# Patient Record
Sex: MALE | Race: WHITE | NOT HISPANIC OR LATINO | Employment: UNEMPLOYED | URBAN - NONMETROPOLITAN AREA
[De-identification: names, ages, dates, MRNs, and addresses within clinical notes are randomized per-mention and may not be internally consistent; named-entity substitution may affect disease eponyms.]

---

## 2017-05-14 ENCOUNTER — APPOINTMENT (OUTPATIENT)
Dept: CT IMAGING | Facility: HOSPITAL | Age: 57
End: 2017-05-14

## 2017-05-14 ENCOUNTER — APPOINTMENT (OUTPATIENT)
Dept: GENERAL RADIOLOGY | Facility: HOSPITAL | Age: 57
End: 2017-05-14

## 2017-05-14 ENCOUNTER — HOSPITAL ENCOUNTER (EMERGENCY)
Facility: HOSPITAL | Age: 57
Discharge: LEFT BEFORE DISCHARGE | End: 2017-05-15
Attending: EMERGENCY MEDICINE | Admitting: EMERGENCY MEDICINE

## 2017-05-14 DIAGNOSIS — G45.9 TRANSIENT CEREBRAL ISCHEMIA, UNSPECIFIED TYPE: ICD-10-CM

## 2017-05-14 DIAGNOSIS — I16.1 HYPERTENSIVE EMERGENCY WITHOUT CONGESTIVE HEART FAILURE: Primary | ICD-10-CM

## 2017-05-14 DIAGNOSIS — R07.9 CHEST PAIN, UNSPECIFIED TYPE: ICD-10-CM

## 2017-05-14 LAB
6-ACETYL MORPHINE: NEGATIVE
ALBUMIN SERPL-MCNC: 3.5 G/DL (ref 3.5–5)
ALBUMIN/GLOB SERPL: 1.2 G/DL (ref 1.5–2.5)
ALP SERPL-CCNC: 93 U/L (ref 40–129)
ALT SERPL W P-5'-P-CCNC: 14 U/L (ref 10–44)
AMPHET+METHAMPHET UR QL: NEGATIVE
AMYLASE SERPL-CCNC: 44 U/L (ref 28–100)
ANION GAP SERPL CALCULATED.3IONS-SCNC: ABNORMAL MMOL/L (ref 3.6–11.2)
APTT PPP: 27.9 SECONDS (ref 24.4–31)
AST SERPL-CCNC: 18 U/L (ref 10–34)
BACTERIA UR QL AUTO: ABNORMAL /HPF
BARBITURATES UR QL SCN: NEGATIVE
BASOPHILS # BLD AUTO: 0.07 10*3/MM3 (ref 0–0.3)
BASOPHILS NFR BLD AUTO: 0.9 % (ref 0–2)
BENZODIAZ UR QL SCN: NEGATIVE
BILIRUB SERPL-MCNC: 0.5 MG/DL (ref 0.2–1.8)
BILIRUB UR QL STRIP: NEGATIVE
BUN BLD-MCNC: 10 MG/DL (ref 7–21)
BUN/CREAT SERPL: 14.3 (ref 7–25)
BUPRENORPHINE SERPL-MCNC: NEGATIVE NG/ML
CALCIUM SPEC-SCNC: 9.6 MG/DL (ref 7.7–10)
CANNABINOIDS SERPL QL: NEGATIVE
CHLORIDE SERPL-SCNC: 106 MMOL/L (ref 99–112)
CK MB SERPL-CCNC: 1.76 NG/ML (ref 0–5)
CK MB SERPL-RTO: 2.5 % (ref 0–3)
CK SERPL-CCNC: 71 U/L (ref 24–204)
CLARITY UR: CLEAR
CO2 SERPL-SCNC: >40 MMOL/L (ref 24.3–31.9)
COCAINE UR QL: NEGATIVE
COLOR UR: YELLOW
CREAT BLD-MCNC: 0.7 MG/DL (ref 0.43–1.29)
DEPRECATED RDW RBC AUTO: 43 FL (ref 37–54)
EOSINOPHIL # BLD AUTO: 0.23 10*3/MM3 (ref 0–0.7)
EOSINOPHIL NFR BLD AUTO: 3 % (ref 0–5)
ERYTHROCYTE [DISTWIDTH] IN BLOOD BY AUTOMATED COUNT: 13.6 % (ref 11.5–14.5)
ETHANOL BLD-MCNC: 66 MG/DL
ETHANOL UR QL: 0.07 %
GFR SERPL CREATININE-BSD FRML MDRD: 117 ML/MIN/1.73
GLOBULIN UR ELPH-MCNC: 3 GM/DL
GLUCOSE BLD-MCNC: 93 MG/DL (ref 70–110)
GLUCOSE UR STRIP-MCNC: NEGATIVE MG/DL
HCT VFR BLD AUTO: 45 % (ref 42–52)
HGB BLD-MCNC: 15.7 G/DL (ref 14–18)
HGB UR QL STRIP.AUTO: ABNORMAL
HYALINE CASTS UR QL AUTO: ABNORMAL /LPF
IMM GRANULOCYTES # BLD: 0.01 10*3/MM3 (ref 0–0.03)
IMM GRANULOCYTES NFR BLD: 0.1 % (ref 0–0.5)
INR PPP: 0.9 (ref 0.8–1.1)
KETONES UR QL STRIP: NEGATIVE
LEUKOCYTE ESTERASE UR QL STRIP.AUTO: NEGATIVE
LIPASE SERPL-CCNC: 32 U/L (ref 13–60)
LYMPHOCYTES # BLD AUTO: 2.69 10*3/MM3 (ref 1–3)
LYMPHOCYTES NFR BLD AUTO: 35.6 % (ref 21–51)
MCH RBC QN AUTO: 30.7 PG (ref 27–33)
MCHC RBC AUTO-ENTMCNC: 34.9 G/DL (ref 33–37)
MCV RBC AUTO: 87.9 FL (ref 80–94)
MDMA UR QL SCN: NEGATIVE
METHADONE UR QL SCN: NEGATIVE
MONOCYTES # BLD AUTO: 0.63 10*3/MM3 (ref 0.1–0.9)
MONOCYTES NFR BLD AUTO: 8.3 % (ref 0–10)
MYOGLOBIN SERPL-MCNC: 41 NG/ML (ref 0–109)
NEUTROPHILS # BLD AUTO: 3.93 10*3/MM3 (ref 1.4–6.5)
NEUTROPHILS NFR BLD AUTO: 52.1 % (ref 30–70)
NITRITE UR QL STRIP: NEGATIVE
OPIATES UR QL: NEGATIVE
OSMOLALITY SERPL CALC.SUM OF ELEC: 280 MOSM/KG (ref 273–305)
OXYCODONE UR QL SCN: NEGATIVE
PCP UR QL SCN: NEGATIVE
PH UR STRIP.AUTO: 6.5 [PH] (ref 5–8)
PLATELET # BLD AUTO: 300 10*3/MM3 (ref 130–400)
PMV BLD AUTO: 7.9 FL (ref 6–10)
POTASSIUM BLD-SCNC: 3.4 MMOL/L (ref 3.5–5.3)
PROT SERPL-MCNC: 6.5 G/DL (ref 6–8)
PROT UR QL STRIP: ABNORMAL
PROTHROMBIN TIME: 9.9 SECONDS (ref 9.8–11.9)
RBC # BLD AUTO: 5.12 10*6/MM3 (ref 4.7–6.1)
RBC # UR: ABNORMAL /HPF
REF LAB TEST METHOD: ABNORMAL
SODIUM BLD-SCNC: 141 MMOL/L (ref 135–153)
SP GR UR STRIP: 1.01 (ref 1–1.03)
SQUAMOUS #/AREA URNS HPF: ABNORMAL /HPF
TROPONIN I SERPL-MCNC: 0.18 NG/ML
UROBILINOGEN UR QL STRIP: ABNORMAL
WBC NRBC COR # BLD: 7.56 10*3/MM3 (ref 4.5–12.5)
WBC UR QL AUTO: ABNORMAL /HPF

## 2017-05-14 PROCEDURE — 70450 CT HEAD/BRAIN W/O DYE: CPT | Performed by: RADIOLOGY

## 2017-05-14 PROCEDURE — 85025 COMPLETE CBC W/AUTO DIFF WBC: CPT | Performed by: EMERGENCY MEDICINE

## 2017-05-14 PROCEDURE — 93010 ELECTROCARDIOGRAM REPORT: CPT | Performed by: INTERNAL MEDICINE

## 2017-05-14 PROCEDURE — 99284 EMERGENCY DEPT VISIT MOD MDM: CPT

## 2017-05-14 PROCEDURE — 85610 PROTHROMBIN TIME: CPT | Performed by: EMERGENCY MEDICINE

## 2017-05-14 PROCEDURE — 80307 DRUG TEST PRSMV CHEM ANLYZR: CPT | Performed by: EMERGENCY MEDICINE

## 2017-05-14 PROCEDURE — 80053 COMPREHEN METABOLIC PANEL: CPT | Performed by: EMERGENCY MEDICINE

## 2017-05-14 PROCEDURE — 71020 HC CHEST PA AND LATERAL: CPT

## 2017-05-14 PROCEDURE — 82150 ASSAY OF AMYLASE: CPT | Performed by: EMERGENCY MEDICINE

## 2017-05-14 PROCEDURE — 93005 ELECTROCARDIOGRAM TRACING: CPT | Performed by: EMERGENCY MEDICINE

## 2017-05-14 PROCEDURE — 71020 XR CHEST 2 VW: CPT | Performed by: RADIOLOGY

## 2017-05-14 PROCEDURE — 83690 ASSAY OF LIPASE: CPT | Performed by: EMERGENCY MEDICINE

## 2017-05-14 PROCEDURE — 82553 CREATINE MB FRACTION: CPT | Performed by: EMERGENCY MEDICINE

## 2017-05-14 PROCEDURE — 82550 ASSAY OF CK (CPK): CPT | Performed by: EMERGENCY MEDICINE

## 2017-05-14 PROCEDURE — 70450 CT HEAD/BRAIN W/O DYE: CPT

## 2017-05-14 PROCEDURE — 84484 ASSAY OF TROPONIN QUANT: CPT | Performed by: EMERGENCY MEDICINE

## 2017-05-14 PROCEDURE — 85730 THROMBOPLASTIN TIME PARTIAL: CPT | Performed by: EMERGENCY MEDICINE

## 2017-05-14 PROCEDURE — 81001 URINALYSIS AUTO W/SCOPE: CPT | Performed by: EMERGENCY MEDICINE

## 2017-05-14 PROCEDURE — 83874 ASSAY OF MYOGLOBIN: CPT | Performed by: EMERGENCY MEDICINE

## 2017-05-14 RX ORDER — SODIUM CHLORIDE 0.9 % (FLUSH) 0.9 %
10 SYRINGE (ML) INJECTION AS NEEDED
Status: DISCONTINUED | OUTPATIENT
Start: 2017-05-14 | End: 2017-05-15 | Stop reason: HOSPADM

## 2017-05-14 RX ORDER — HYDRALAZINE HYDROCHLORIDE 20 MG/ML
10 INJECTION INTRAMUSCULAR; INTRAVENOUS ONCE
Status: COMPLETED | OUTPATIENT
Start: 2017-05-14 | End: 2017-05-15

## 2017-05-14 RX ORDER — CLONIDINE HYDROCHLORIDE 0.1 MG/1
0.2 TABLET ORAL ONCE
Status: COMPLETED | OUTPATIENT
Start: 2017-05-14 | End: 2017-05-14

## 2017-05-14 RX ADMIN — CLONIDINE HYDROCHLORIDE 0.2 MG: 0.1 TABLET ORAL at 22:40

## 2017-05-15 VITALS
WEIGHT: 150 LBS | HEART RATE: 77 BPM | HEIGHT: 66 IN | DIASTOLIC BLOOD PRESSURE: 88 MMHG | RESPIRATION RATE: 20 BRPM | BODY MASS INDEX: 24.11 KG/M2 | SYSTOLIC BLOOD PRESSURE: 159 MMHG | TEMPERATURE: 97.9 F | OXYGEN SATURATION: 94 %

## 2017-05-15 LAB
CK MB SERPL-CCNC: 1.56 NG/ML (ref 0–5)
CK MB SERPL-RTO: 2.6 % (ref 0–3)
CK SERPL-CCNC: 61 U/L (ref 24–204)
MYOGLOBIN SERPL-MCNC: 41 NG/ML (ref 0–109)
TROPONIN I SERPL-MCNC: 0.17 NG/ML

## 2017-05-15 PROCEDURE — 83874 ASSAY OF MYOGLOBIN: CPT | Performed by: EMERGENCY MEDICINE

## 2017-05-15 PROCEDURE — 82553 CREATINE MB FRACTION: CPT | Performed by: EMERGENCY MEDICINE

## 2017-05-15 PROCEDURE — 96374 THER/PROPH/DIAG INJ IV PUSH: CPT

## 2017-05-15 PROCEDURE — 82550 ASSAY OF CK (CPK): CPT | Performed by: EMERGENCY MEDICINE

## 2017-05-15 PROCEDURE — 84484 ASSAY OF TROPONIN QUANT: CPT | Performed by: EMERGENCY MEDICINE

## 2017-05-15 PROCEDURE — 25010000002 HYDRALAZINE PER 20 MG: Performed by: EMERGENCY MEDICINE

## 2017-05-15 RX ADMIN — HYDRALAZINE HYDROCHLORIDE 10 MG: 20 INJECTION INTRAMUSCULAR; INTRAVENOUS at 01:05

## 2020-06-25 ENCOUNTER — TRANSCRIBE ORDERS (OUTPATIENT)
Dept: ADMINISTRATIVE | Facility: HOSPITAL | Age: 60
End: 2020-06-25

## 2020-06-25 ENCOUNTER — LAB (OUTPATIENT)
Dept: LAB | Facility: HOSPITAL | Age: 60
End: 2020-06-25

## 2020-06-25 DIAGNOSIS — N18.9 CHRONIC KIDNEY DISEASE, UNSPECIFIED CKD STAGE: Primary | ICD-10-CM

## 2020-06-25 DIAGNOSIS — N18.9 CHRONIC KIDNEY DISEASE, UNSPECIFIED CKD STAGE: ICD-10-CM

## 2020-06-25 PROCEDURE — 36415 COLL VENOUS BLD VENIPUNCTURE: CPT

## 2020-06-25 PROCEDURE — 81001 URINALYSIS AUTO W/SCOPE: CPT

## 2020-06-25 PROCEDURE — 80053 COMPREHEN METABOLIC PANEL: CPT

## 2020-06-26 ENCOUNTER — LAB (OUTPATIENT)
Dept: LAB | Facility: HOSPITAL | Age: 60
End: 2020-06-26

## 2020-06-26 DIAGNOSIS — N18.9 CHRONIC KIDNEY DISEASE, UNSPECIFIED CKD STAGE: ICD-10-CM

## 2020-06-26 LAB
ALBUMIN SERPL-MCNC: 4 G/DL (ref 3.5–5.2)
ALBUMIN/GLOB SERPL: 1.7 G/DL
ALP SERPL-CCNC: 79 U/L (ref 39–117)
ALT SERPL W P-5'-P-CCNC: 16 U/L (ref 1–41)
ANION GAP SERPL CALCULATED.3IONS-SCNC: 11.7 MMOL/L (ref 5–15)
AST SERPL-CCNC: 16 U/L (ref 1–40)
BILIRUB SERPL-MCNC: 0.4 MG/DL (ref 0.2–1.2)
BUN BLD-MCNC: 30 MG/DL (ref 6–20)
BUN/CREAT SERPL: 13.7 (ref 7–25)
CALCIUM SPEC-SCNC: 9.5 MG/DL (ref 8.6–10.5)
CHLORIDE SERPL-SCNC: 100 MMOL/L (ref 98–107)
CO2 SERPL-SCNC: 22.3 MMOL/L (ref 22–29)
CREAT BLD-MCNC: 2.19 MG/DL (ref 0.76–1.27)
GFR SERPL CREATININE-BSD FRML MDRD: 31 ML/MIN/1.73
GLOBULIN UR ELPH-MCNC: 2.4 GM/DL
GLUCOSE BLD-MCNC: 139 MG/DL (ref 65–99)
POTASSIUM BLD-SCNC: 5.5 MMOL/L (ref 3.5–5.2)
PROT SERPL-MCNC: 6.4 G/DL (ref 6–8.5)
SODIUM BLD-SCNC: 134 MMOL/L (ref 136–145)

## 2020-06-26 PROCEDURE — 84156 ASSAY OF PROTEIN URINE: CPT

## 2020-06-26 PROCEDURE — 82570 ASSAY OF URINE CREATININE: CPT

## 2020-06-26 PROCEDURE — 82043 UR ALBUMIN QUANTITATIVE: CPT

## 2020-06-27 LAB
ALBUMIN UR-MCNC: 45.8 MG/DL
BACTERIA UR QL AUTO: ABNORMAL /HPF
BILIRUB UR QL STRIP: NEGATIVE
CLARITY UR: CLEAR
COLOR UR: YELLOW
CREAT UR-MCNC: 246.1 MG/DL
CREAT UR-MCNC: 246.1 MG/DL
GLUCOSE UR STRIP-MCNC: NEGATIVE MG/DL
HGB UR QL STRIP.AUTO: NEGATIVE
HYALINE CASTS UR QL AUTO: ABNORMAL /LPF
KETONES UR QL STRIP: ABNORMAL
LEUKOCYTE ESTERASE UR QL STRIP.AUTO: NEGATIVE
MICROALBUMIN/CREAT UR: 186.1 MG/G
NITRITE UR QL STRIP: NEGATIVE
PH UR STRIP.AUTO: <=5 [PH] (ref 5–8)
PROT UR QL STRIP: ABNORMAL
PROT UR-MCNC: 66 MG/DL
PROT/CREAT UR: 268.2 MG/G CREA (ref 0–200)
RBC # UR: ABNORMAL /HPF
REF LAB TEST METHOD: ABNORMAL
SP GR UR STRIP: 1.02 (ref 1–1.03)
SQUAMOUS #/AREA URNS HPF: ABNORMAL /HPF
UROBILINOGEN UR QL STRIP: ABNORMAL
WBC UR QL AUTO: ABNORMAL /HPF

## 2023-04-25 ENCOUNTER — OFFICE VISIT (OUTPATIENT)
Dept: PULMONOLOGY | Facility: CLINIC | Age: 63
End: 2023-04-25
Payer: MEDICAID

## 2023-04-25 VITALS
SYSTOLIC BLOOD PRESSURE: 102 MMHG | WEIGHT: 166 LBS | TEMPERATURE: 98 F | DIASTOLIC BLOOD PRESSURE: 60 MMHG | RESPIRATION RATE: 18 BRPM | HEART RATE: 77 BPM | BODY MASS INDEX: 26.68 KG/M2 | OXYGEN SATURATION: 92 % | HEIGHT: 66 IN

## 2023-04-25 DIAGNOSIS — J44.9 CHRONIC OBSTRUCTIVE PULMONARY DISEASE, UNSPECIFIED COPD TYPE: Primary | ICD-10-CM

## 2023-04-25 DIAGNOSIS — E66.3 OVERWEIGHT: ICD-10-CM

## 2023-04-25 DIAGNOSIS — F17.211 CIGARETTE NICOTINE DEPENDENCE IN REMISSION: ICD-10-CM

## 2023-04-25 RX ORDER — ATORVASTATIN CALCIUM 40 MG/1
40 TABLET, FILM COATED ORAL DAILY
COMMUNITY
Start: 2023-03-28

## 2023-04-25 RX ORDER — BENZOCAINE 7.5; 5 MG/1; MG/1
LOZENGE ORAL
COMMUNITY
Start: 2023-04-13

## 2023-04-25 RX ORDER — ASPIRIN 81 MG/1
1 TABLET, COATED ORAL DAILY
COMMUNITY
Start: 2023-03-28

## 2023-04-25 RX ORDER — TAMSULOSIN HYDROCHLORIDE 0.4 MG/1
CAPSULE ORAL
COMMUNITY
Start: 2023-04-13

## 2023-04-25 RX ORDER — ALBUTEROL SULFATE 90 UG/1
AEROSOL, METERED RESPIRATORY (INHALATION)
COMMUNITY
Start: 2023-03-28

## 2023-04-25 RX ORDER — LABETALOL 200 MG/1
TABLET, FILM COATED ORAL
COMMUNITY
Start: 2023-03-28

## 2023-04-25 RX ORDER — BUDESONIDE, GLYCOPYRROLATE, AND FORMOTEROL FUMARATE 160; 9; 4.8 UG/1; UG/1; UG/1
2 AEROSOL, METERED RESPIRATORY (INHALATION) 2 TIMES DAILY
Qty: 10.7 G | Refills: 5 | Status: SHIPPED | OUTPATIENT
Start: 2023-04-25

## 2023-04-25 RX ORDER — DOXYCYCLINE 100 MG/1
CAPSULE ORAL
COMMUNITY
Start: 2023-04-13

## 2023-04-25 RX ORDER — FLUTICASONE PROPIONATE AND SALMETEROL 50; 250 UG/1; UG/1
POWDER RESPIRATORY (INHALATION)
COMMUNITY
Start: 2023-03-28 | End: 2023-04-25

## 2023-04-25 RX ORDER — LISINOPRIL 20 MG/1
TABLET ORAL
COMMUNITY
Start: 2023-03-28 | End: 2023-04-27

## 2023-04-25 NOTE — PROGRESS NOTES
"Chief Complaint  Shortness of Breath    Subjective        Florencio Cintron presents to [facility] PULMONARY & CRITICAL CARE MEDICINE  History of Present Illness     Mr. Cintron is a 62 year old male with a medical history significant for hypertension, and COPD.    He presents today for evaluation of COPD.  He states that he has had shortness of breath for several years and that it is worse with exertion and at night.  He also reports an occasional cough.  He is currently taking Adviar BID and albuterol as needed.  He reports that these inhalers do help some.  He is not currently using any oxygen.  He is a former smoker, quitting 3 years ago.  He states that he smoked 2 ppd for 40 years.        Objective   Vital Signs:  /60   Pulse 77   Temp 98 °F (36.7 °C) (Temporal)   Resp 18   Ht 167.6 cm (66\")   Wt 75.3 kg (166 lb)   SpO2 92%   BMI 26.79 kg/m²   Estimated body mass index is 26.79 kg/m² as calculated from the following:    Height as of this encounter: 167.6 cm (66\").    Weight as of this encounter: 75.3 kg (166 lb).       BMI is >= 25 and <30. (Overweight) The following options were offered after discussion;: exercise counseling/recommendations and nutrition counseling/recommendations      Physical Exam     GENERAL APPEARANCE: Well developed, well nourished, alert and cooperative, and appears to be in no acute distress.    HEAD: normocephalic. Atraumatic.    EYES: PERRL, EOMI. Vision is grossly intact.    THROAT: Oral cavity and pharynx normal. No inflammation, swelling, exudate, or lesions.     NECK: Neck supple.  No thyromegaly.    CARDIAC: Normal S1 and S2. No S3, S4 or murmurs. Rhythm is regular.     RESPIRATORY:Bilateral air entry positive. Bilateral diminished breath sounds. No wheezing, crackles or rhonchi noted.    GI: Positive bowel sounds. Soft, nondistended, nontender.     MUSCULOSKELETAL: No significant deformity or joint abnormality. No edema. Peripheral pulses intact. No " varicosities.    NEUROLOGICAL: Strength and sensation symmetric and intact throughout.     PSYCHIATRIC: The mental examination revealed the patient was oriented to person, place, and time.     Result Review :  The following data was reviewed by: OLIVIA Hunter on 04/25/2023:                   Assessment and Plan   Diagnoses and all orders for this visit:    1. Chronic obstructive pulmonary disease, unspecified COPD type (Primary)  -     Overnight Sleep Oximetry Study; Future    2. Overweight    3. Cigarette nicotine dependence in remission    Other orders  -     Budeson-Glycopyrrol-Formoterol (Breztri Aerosphere) 160-9-4.8 MCG/ACT aerosol inhaler; Inhale 2 puffs 2 (Two) Times a Day.  Dispense: 10.7 g; Refill: 5         PFT was reviewed.  Severe obstruction was noted.  CT Chest was also reviewed.    Will discontinue Advair and start him on Breztri BID.  Inhaler education was provided.       - Inhaler technique demonstration/discussion:  I have extensively discussed the steps.  In summary, the steps were discussed in the following order.Patient was advised to rinse the mouth after steroid inhalation to prevent fungal mucositis.  · Remove the cap from the inhaler and shake well.    · Breathe out all the way.    · Place the mouthpiece of the inhaler between your teeth and seal your lips tightly around it.    · As you start to breathe in slowly, press down on the canister one time.   · Keep breathing in as slowly and deeply as you can.    · It should take about 5 seconds for you to completely breathe in.    · Hold your breath for 10 seconds(count to 10 slowly) to allow the medication to reach the airways of the lung.    · Repeat the above steps for each puff.    · Wait about 1 minute between the puffs.    · Replaced the cap on the inhaler when finished.    Continue albuterol as needed.      6 MWT was completed in office.  No oxygen desaturations were noted with ambulation.    Ordered an overnight pulse  oximetry study to assess oxygen while asleep.        Will resume annual lung cancer screening in April 2024.      Follow Up   Return in about 2 months (around 6/25/2023).  Patient was given instructions and counseling regarding his condition or for health maintenance advice. Please see specific information pulled into the AVS if appropriate.

## 2023-04-27 ENCOUNTER — OFFICE VISIT (OUTPATIENT)
Dept: CARDIOLOGY | Facility: CLINIC | Age: 63
End: 2023-04-27
Payer: MEDICAID

## 2023-04-27 VITALS
BODY MASS INDEX: 26.33 KG/M2 | HEIGHT: 66 IN | DIASTOLIC BLOOD PRESSURE: 77 MMHG | OXYGEN SATURATION: 98 % | HEART RATE: 61 BPM | SYSTOLIC BLOOD PRESSURE: 156 MMHG | WEIGHT: 163.8 LBS

## 2023-04-27 DIAGNOSIS — Z78.9 ALCOHOL USE: ICD-10-CM

## 2023-04-27 DIAGNOSIS — Z95.810 PRESENCE OF EXTERNAL CARDIAC DEFIBRILLATOR: ICD-10-CM

## 2023-04-27 DIAGNOSIS — I10 PRIMARY HYPERTENSION: ICD-10-CM

## 2023-04-27 DIAGNOSIS — I25.10 CAD, MULTIPLE VESSEL: Primary | ICD-10-CM

## 2023-04-27 DIAGNOSIS — I42.9 CARDIOMYOPATHY, UNSPECIFIED TYPE: ICD-10-CM

## 2023-04-27 DIAGNOSIS — N18.30 STAGE 3 CHRONIC KIDNEY DISEASE, UNSPECIFIED WHETHER STAGE 3A OR 3B CKD: ICD-10-CM

## 2023-04-27 DIAGNOSIS — I50.20 HEART FAILURE WITH REDUCED EJECTION FRACTION: ICD-10-CM

## 2023-04-27 RX ORDER — ISOSORBIDE MONONITRATE 30 MG/1
30 TABLET, EXTENDED RELEASE ORAL DAILY
Qty: 30 TABLET | Refills: 1 | Status: SHIPPED | OUTPATIENT
Start: 2023-04-27

## 2023-04-27 RX ORDER — BUMETANIDE 0.5 MG/1
0.5 TABLET ORAL EVERY OTHER DAY
Qty: 30 TABLET | Refills: 0 | Status: SHIPPED | OUTPATIENT
Start: 2023-04-27

## 2023-04-27 RX ORDER — CLOPIDOGREL BISULFATE 75 MG/1
75 TABLET ORAL DAILY
Qty: 30 TABLET | Refills: 1 | Status: SHIPPED | OUTPATIENT
Start: 2023-04-27

## 2023-04-27 RX ORDER — SACUBITRIL AND VALSARTAN 24; 26 MG/1; MG/1
1 TABLET, FILM COATED ORAL 2 TIMES DAILY
Qty: 28 TABLET | Refills: 0 | COMMUNITY
Start: 2023-04-29

## 2023-04-27 NOTE — LETTER
May 3, 2023     OLIVIA Christopher  [address]  [address]  [facility] [address]    Patient: Florencio Cintron   YOB: 1960   Date of Visit: 4/27/2023       Dear OLIVIA Christopher,    Florencio Cintron was in my office today. Below are the relevant portions of my assessment and plan of care.           If you have questions, please do not hesitate to call me. I look forward to following Florencio along with you.         Sincerely,        OLIVIA Barone        CC: No Recipients

## 2023-04-27 NOTE — PROGRESS NOTES
Subjective     Florencio Cintron is a 62 y.o. male.   Chief Complaint   Patient presents with   • Establish Care     Cardiac Care    History of Present Illness   Florencio Cintron is a 62-year-old male who presents to clinic today as a new patient for cardiology evaluation.     He was referred by his PCP for further cardiac evaluation.  He states symptoms began when he presented to his PCP for evaluation of shortness of air and an EKG showed abnormal.  He was subsequently sent to [facility] where he was hospitalized and transferred to [facility] due to cardiomyopathy and multivessel CAD.  Bypass surgery was planned however patient left the hospital.  He does have a LifeVest on.  He has continued to take his medications as prescribed at discharge he reports overall feeling okay.  He states breathing is stable, denies swelling however does complain of orthopnea.  He denies chest discomfort or palpitations.  He has an appointment to see nephrology due to CKD.  He denies any bleeding issues on current medications.  He reports former smoker quit about 3 years ago however does report alcohol use.    Multivessel CAD currently on aspirin.  Hyperlipidemia currently on Lipitor 40 mg nightly.  Hypertension currently on labetalol twice a day, lisinopril 20 mg twice daily.    There is no problem list on file for this patient.    Past Medical History:   Diagnosis Date   • Chronic kidney disease    • COPD (chronic obstructive pulmonary disease)    • Hypertension    • Myocardial infarction    • Stroke      Past Surgical History:   Procedure Laterality Date   • APPENDECTOMY     • TONSILLECTOMY         Family History   Problem Relation Age of Onset   • Heart disease Mother      Social History     Tobacco Use   • Smoking status: Former     Packs/day: 2.00     Years: 40.00     Pack years: 80.00     Types: Cigarettes     Quit date: 4/1/2020     Years since quitting: 3.0   • Smokeless tobacco: Never   Substance Use Topics   •  Alcohol use: Yes     Comment: qnce a week   • Drug use: Not Currently         The following portions of the patient's history were reviewed and updated as appropriate: allergies, current medications, past family history, past medical history, past social history, past surgical history and problem list.    No Known Allergies      Current Outpatient Medications:   •  Aspirin Low Dose 81 MG EC tablet, Take 1 tablet by mouth Daily., Disp: , Rfl:   •  atorvastatin (LIPITOR) 40 MG tablet, Take 1 tablet by mouth Daily. for cholesterol, Disp: , Rfl:   •  Budeson-Glycopyrrol-Formoterol (Breztri Aerosphere) 160-9-4.8 MCG/ACT aerosol inhaler, Inhale 2 puffs 2 (Two) Times a Day., Disp: 10.7 g, Rfl: 5  •  doxycycline (MONODOX) 100 MG capsule, TAKE ONE CAPSULE BY MOUTH TWO TIMES A DAY FOR 10 DAYS FOR INFECTION **FINISH ALL OF THIS MEDICATION**, Disp: , Rfl:   •  Earwax Removal 6.5 % otic solution, INSTILL 4 DROPS IN AFFECTED EAR(S) TWO TIMES A DAY FOR 5 DAYS, Disp: , Rfl:   •  labetalol (NORMODYNE) 200 MG tablet, TAKE ONE TABLET BY MOUTH TWO TIMES A DAY FOR BLOOD PRESSURE, Disp: , Rfl:   •  tamsulosin (FLOMAX) 0.4 MG capsule 24 hr capsule, TAKE ONE CAPSULE BY MOUTH DAILY FOR PROSTATE, Disp: , Rfl:   •  Ventolin  (90 Base) MCG/ACT inhaler, INHALE TWO PUFFS BY MOUTH EVERY 4 TO 6 HOURS AS NEEDED FOR SHORTNESS OF BREATH AND/OR WHEEZING, Disp: , Rfl:   •  bumetanide (BUMEX) 0.5 MG tablet, Take 1 tablet by mouth Every Other Day., Disp: 30 tablet, Rfl: 0  •  clopidogrel (PLAVIX) 75 MG tablet, Take 1 tablet by mouth Daily., Disp: 30 tablet, Rfl: 1  •  isosorbide mononitrate (IMDUR) 30 MG 24 hr tablet, Take 1 tablet by mouth Daily., Disp: 30 tablet, Rfl: 1  •  sacubitril-valsartan (Entresto) 24-26 MG tablet, Take 1 tablet by mouth 2 (Two) Times a Day., Disp: 28 tablet, Rfl: 0    Review of Systems   Constitutional: Negative for activity change, appetite change, chills, diaphoresis, fatigue and fever.   HENT: Negative for congestion,  "drooling, ear discharge, ear pain, mouth sores, nosebleeds, postnasal drip, rhinorrhea, sinus pressure, sneezing and sore throat.    Eyes: Negative for pain, discharge and visual disturbance.   Respiratory: Positive for shortness of breath. Negative for cough, chest tightness and wheezing.    Cardiovascular: Negative for chest pain, palpitations and leg swelling.   Gastrointestinal: Negative for abdominal pain, constipation, diarrhea, nausea and vomiting.   Endocrine: Negative for cold intolerance, heat intolerance, polydipsia, polyphagia and polyuria.   Musculoskeletal: Negative for arthralgias, myalgias and neck pain.   Skin: Negative for rash and wound.   Neurological: Negative for dizziness, syncope, speech difficulty, weakness, light-headedness and headaches.   Hematological: Negative for adenopathy. Does not bruise/bleed easily.   Psychiatric/Behavioral: Negative for confusion, dysphoric mood and sleep disturbance. The patient is not nervous/anxious.    All other systems reviewed and are negative.    /77 (BP Location: Left arm, Patient Position: Sitting, Cuff Size: Adult)   Pulse 61   Ht 167.6 cm (66\")   Wt 74.3 kg (163 lb 12.8 oz)   SpO2 98%   BMI 26.44 kg/m²     Objective   No Known Allergies    Physical Exam  Vitals reviewed.   Constitutional:       Appearance: Normal appearance. He is well-developed.   HENT:      Head: Normocephalic.   Eyes:      Conjunctiva/sclera: Conjunctivae normal.   Neck:      Thyroid: No thyromegaly.      Vascular: No carotid bruit or JVD.   Cardiovascular:      Rate and Rhythm: Normal rate and regular rhythm.      Comments: LifeVest on   Pulmonary:      Effort: Pulmonary effort is normal.      Breath sounds: Normal breath sounds.   Abdominal:      General: Bowel sounds are normal.      Palpations: Abdomen is soft. There is no hepatomegaly or splenomegaly.      Tenderness: There is no abdominal tenderness.   Musculoskeletal:      Cervical back: Neck supple.      Right " lower leg: No edema.      Left lower leg: No edema.   Skin:     General: Skin is warm and dry.   Neurological:      Mental Status: He is alert and oriented to person, place, and time.   Psychiatric:         Attention and Perception: Attention normal.         Mood and Affect: Mood normal.         Speech: Speech normal.         Behavior: Behavior normal. Behavior is cooperative.         Cognition and Memory: Cognition normal.           ECG 12 Lead    Date/Time: 5/3/2023 11:25 AM  Performed by: Elinor Walls APRN  Authorized by: Elinor Walls APRN   Comparison: compared with previous ECG   Similar to previous ECG  Rhythm: sinus rhythm  Rate: normal  BPM: 61  Other findings: T wave abnormality and left ventricular hypertrophy    Clinical impression: abnormal EKG  Comments: QT/QTc - 419/421            LABS  WBC   Date Value Ref Range Status   05/14/2017 7.56 4.50 - 12.50 10*3/mm3 Final     RBC   Date Value Ref Range Status   05/14/2017 5.12 4.70 - 6.10 10*6/mm3 Final     Hemoglobin   Date Value Ref Range Status   05/14/2017 15.7 14.0 - 18.0 g/dL Final     Hematocrit   Date Value Ref Range Status   05/14/2017 45.0 42.0 - 52.0 % Final     MCV   Date Value Ref Range Status   05/14/2017 87.9 80.0 - 94.0 fL Final     MCH   Date Value Ref Range Status   05/14/2017 30.7 27.0 - 33.0 pg Final     MCHC   Date Value Ref Range Status   05/14/2017 34.9 33.0 - 37.0 g/dL Final     RDW   Date Value Ref Range Status   05/14/2017 13.6 11.5 - 14.5 % Final     RDW-SD   Date Value Ref Range Status   05/14/2017 43.0 37.0 - 54.0 fl Final     MPV   Date Value Ref Range Status   05/14/2017 7.9 6.0 - 10.0 fL Final     Platelets   Date Value Ref Range Status   05/14/2017 300 130 - 400 10*3/mm3 Final     Neutrophil %   Date Value Ref Range Status   05/14/2017 52.1 30.0 - 70.0 % Final     Lymphocyte %   Date Value Ref Range Status   05/14/2017 35.6 21.0 - 51.0 % Final     Monocyte %   Date Value Ref Range Status   05/14/2017 8.3 0.0 - 10.0  % Final     Eosinophil %   Date Value Ref Range Status   05/14/2017 3.0 0.0 - 5.0 % Final     Basophil %   Date Value Ref Range Status   05/14/2017 0.9 0.0 - 2.0 % Final     Immature Grans %   Date Value Ref Range Status   05/14/2017 0.1 0.0 - 0.5 % Final     Neutrophils, Absolute   Date Value Ref Range Status   05/14/2017 3.93 1.40 - 6.50 10*3/mm3 Final     Lymphocytes, Absolute   Date Value Ref Range Status   05/14/2017 2.69 1.00 - 3.00 10*3/mm3 Final     Monocytes, Absolute   Date Value Ref Range Status   05/14/2017 0.63 0.10 - 0.90 10*3/mm3 Final     Eosinophils, Absolute   Date Value Ref Range Status   05/14/2017 0.23 0.00 - 0.70 10*3/mm3 Final     Basophils, Absolute   Date Value Ref Range Status   05/14/2017 0.07 0.00 - 0.30 10*3/mm3 Final     Immature Grans, Absolute   Date Value Ref Range Status   05/14/2017 0.01 0.00 - 0.03 10*3/mm3 Final   No results found for: CHOL, TRIG, HDL, LDL    IMAGING  XR Chest 1 View    Result Date: 3/29/2023  No acute cardiopulmonary process. Continued follow-up recommended. Images reviewed, interpreted, and dictated by Dr. SANKET Car. Transcribed by IVONE Hess (R).    CT chest without IV contrast    Result Date: 4/1/2023  Small bilateral pleural effusions with lung base atelectasis. Coronary artery disease and cardiomegaly. Images personally reviewed, interpreted and dictated by BIBIANA Cavanaugh M.D.    Ultrasound Kidneys Bilateral    Result Date: 3/30/2023  No evidence of obstructive uropathy. Small amount of perihepatic and perinephric free fluid/small volume ascites. Questionable hepatic parenchymal disease/cirrhosis. Correlate clinically. Images personally reviewed, interpreted and dictated by THIAGO Ashraf.            Assessment & Plan   Diagnoses and all orders for this visit:    1. CAD, multiple vessel (Primary)  -     Ambulatory Referral to Cardiothoracic Surgery  -     ECG 12 Lead  Continue aspirin  Start Plavix 75 mg daily  Start Imdur 30 mg  daily  Continue statin  Referral to cardiothoracic surgeon     2. Cardiomyopathy, unspecified type  Kidney function has been stable on lisinopril.  Stop lisinopril for 36 hours then start Entresto 24-26 twice daily with close monitoring of kidney function and BP.   Discussed risk and benefits of LifeVest  Continue on labetalol, will plan to transition to Coreg in the future if vitals remain stable and patient can tolerate  Unable to add MRA secondary to kidney function currently we will continue to monitor  Consider addition of SGLT2 and Verquvo if VS and labs allow     3. Heart failure with reduced ejection fraction  -     BNP; Future  Bumex 0.5 mg every other day, continue to closely monitor kidney function, labs prior to follow-up visit, sodium restrictions and daily weight management recommended.    4. Primary hypertension  -     Basic Metabolic Panel; Future  Elevated in clinic, will continue to monitor with medication changes, sodium restrictions    5. Stage 3 chronic kidney disease, unspecified whether stage 3a or 3b CKD  Continue to follow with nephrology    6. Alcohol use  Recommend avoidance of alcohol use and discussed risk of continued use on cardiovascular health    7. Presence of external cardiac defibrillator  Discussed risk and benefits K[place]hen compliance with therapy.  We will plan to repeat an ultrasound to reevaluate EF in 3 months    Other orders  -     sacubitril-valsartan (Entresto) 24-26 MG tablet; Take 1 tablet by mouth 2 (Two) Times a Day.  Dispense: 28 tablet; Refill: 0  -     bumetanide (BUMEX) 0.5 MG tablet; Take 1 tablet by mouth Every Other Day.  Dispense: 30 tablet; Refill: 0  -     clopidogrel (PLAVIX) 75 MG tablet; Take 1 tablet by mouth Daily.  Dispense: 30 tablet; Refill: 1  -     isosorbide mononitrate (IMDUR) 30 MG 24 hr tablet; Take 1 tablet by mouth Daily.  Dispense: 30 tablet; Refill: 1      Discussed the importance of close follow-up, lab monitoring and adherence to medical  regimen.  He expresses understanding  Review of medical records [facility] and [facility].  Follow-up in 1 week, sooner if needed

## 2023-04-28 ENCOUNTER — TELEPHONE (OUTPATIENT)
Dept: PULMONOLOGY | Facility: CLINIC | Age: 63
End: 2023-04-28
Payer: MEDICAID

## 2023-04-28 DIAGNOSIS — J44.9 COPD WITH HYPOXIA: Primary | ICD-10-CM

## 2023-04-28 DIAGNOSIS — R09.02 COPD WITH HYPOXIA: Primary | ICD-10-CM

## 2023-04-28 NOTE — TELEPHONE ENCOUNTER
----- Message from OLIVIA Hunter sent at 4/28/2023  3:36 PM EDT -----  Will you let him know that he does need oxygen at night.  I put the order in.  ----- Message -----  From: Tom Benavidez Incoming  Sent: 4/28/2023  12:37 PM EDT  To: OLIVIA Hunter

## 2023-04-28 NOTE — PROGRESS NOTES
Overnight pulse oximetry study was reviewed.  Oxygen desaturations were noted during sleep.  Will start him on supplemental oxygen at night.

## 2023-05-04 ENCOUNTER — LAB (OUTPATIENT)
Dept: LAB | Facility: HOSPITAL | Age: 63
End: 2023-05-04
Payer: MEDICAID

## 2023-05-04 ENCOUNTER — HOSPITAL ENCOUNTER (OUTPATIENT)
Dept: CARDIOLOGY | Facility: HOSPITAL | Age: 63
Discharge: HOME | End: 2023-05-04

## 2023-05-04 DIAGNOSIS — I50.20 HEART FAILURE WITH REDUCED EJECTION FRACTION: ICD-10-CM

## 2023-05-04 DIAGNOSIS — I10 PRIMARY HYPERTENSION: ICD-10-CM

## 2023-05-04 DIAGNOSIS — Z00.6 ENCOUNTER FOR EXAMINATION FOR NORMAL COMPARISON OR CONTROL IN CLINICAL RESEARCH PROGRAM: ICD-10-CM

## 2023-05-04 LAB
ANION GAP SERPL CALCULATED.3IONS-SCNC: 9.7 MMOL/L (ref 5–15)
BUN SERPL-MCNC: 35 MG/DL (ref 8–23)
BUN/CREAT SERPL: 11.6 (ref 7–25)
CALCIUM SPEC-SCNC: 9.1 MG/DL (ref 8.6–10.5)
CHLORIDE SERPL-SCNC: 104 MMOL/L (ref 98–107)
CO2 SERPL-SCNC: 26.3 MMOL/L (ref 22–29)
CREAT SERPL-MCNC: 3.03 MG/DL (ref 0.76–1.27)
EGFRCR SERPLBLD CKD-EPI 2021: 22.6 ML/MIN/1.73
GLUCOSE SERPL-MCNC: 127 MG/DL (ref 65–99)
NT-PROBNP SERPL-MCNC: 1139 PG/ML (ref 0–900)
POTASSIUM SERPL-SCNC: 4 MMOL/L (ref 3.5–5.2)
SODIUM SERPL-SCNC: 140 MMOL/L (ref 136–145)

## 2023-05-04 PROCEDURE — 83880 ASSAY OF NATRIURETIC PEPTIDE: CPT

## 2023-05-04 PROCEDURE — 80048 BASIC METABOLIC PNL TOTAL CA: CPT

## 2023-05-04 PROCEDURE — 36415 COLL VENOUS BLD VENIPUNCTURE: CPT

## 2023-05-05 ENCOUNTER — TELEPHONE (OUTPATIENT)
Dept: CARDIOLOGY | Facility: CLINIC | Age: 63
End: 2023-05-05
Payer: MEDICAID

## 2023-05-05 DIAGNOSIS — N28.9 ABNORMAL KIDNEY FUNCTION: ICD-10-CM

## 2023-05-05 DIAGNOSIS — N18.30 STAGE 3 CHRONIC KIDNEY DISEASE, UNSPECIFIED WHETHER STAGE 3A OR 3B CKD: Primary | ICD-10-CM

## 2023-05-05 NOTE — TELEPHONE ENCOUNTER
Called pt Landy Hoffmann and informed her of the following per Elinor Walls APRN   Kidney function worsened. Stop Bumex and continue on Entresto and recheck BMP the day of his appt next week. How is his BP running? How is he feeling?   BNP is elevated, will continue to monitor   Landy stated pt was still feeling bad, bp running high but pt was only taking 1/2 of entresto I advised that could be the reasoning his bp is running higher then normal. I informed her to have pt to get lab work done we are sending in bmp for him stat. I advised landy to have pt bring current meds, and bp readings in with them the next appt. I informed landy pt was only to take 1/2 entresto if bp is running low.

## 2023-05-05 NOTE — TELEPHONE ENCOUNTER
----- Message from OLIVIA Merino sent at 5/5/2023 10:28 AM EDT -----  Kidney function worsened. Stop Bumex and continue on Entresto and recheck BMP the day of his appt next week. How is his BP running? How is he feeling?   BNP is elevated, will continue to monitor

## 2023-05-08 ENCOUNTER — TELEPHONE (OUTPATIENT)
Dept: CARDIOLOGY | Facility: CLINIC | Age: 63
End: 2023-05-08

## 2023-05-08 NOTE — TELEPHONE ENCOUNTER
called pt to remind him of his labs to be done today that it was urgent to have done pt was a no show this morning for his appt pt states its because he has stomache virus.

## 2023-05-09 ENCOUNTER — TELEPHONE (OUTPATIENT)
Dept: CARDIOLOGY | Facility: CLINIC | Age: 63
End: 2023-05-09
Payer: MEDICAID

## 2023-05-09 NOTE — TELEPHONE ENCOUNTER
called pt to see if he had his labs done, pts significant other stated MA faxed orders to [facility] to be done.

## 2023-05-09 NOTE — TELEPHONE ENCOUNTER
----- Message from OLIVIA Merino sent at 5/9/2023 10:43 AM EDT -----  I have not seen any results of labs work. Can you call to see if he plans to have it and advise it's very important to check his kidney function?  Elinor     ----- Message -----  From: Lab, Background User  Sent: 5/4/2023   4:03 PM EDT  To: OLIVIA Merino

## 2023-05-10 ENCOUNTER — TELEPHONE (OUTPATIENT)
Dept: CARDIOLOGY | Facility: CLINIC | Age: 63
End: 2023-05-10
Payer: MEDICAID

## 2023-05-10 NOTE — TELEPHONE ENCOUNTER
----- Message from OLIVIA Merino sent at 5/10/2023 12:06 PM EDT -----  Labs with much improved kidney function. Continue medications as recommended and keep scheduled follow up.   Elinor   ----- Message -----  From: Tom Benavidez Incoming  Sent: 5/10/2023  12:03 PM EDT  To: OLIVIA Merino

## 2023-05-10 NOTE — TELEPHONE ENCOUNTER
called pt and informed pts significant other the following per Elinor Walls APRN    Labs with much improved kidney function. Continue medications as recommended and keep scheduled follow up.   Elinor

## 2023-05-12 ENCOUNTER — OFFICE VISIT (OUTPATIENT)
Dept: CARDIOLOGY | Facility: CLINIC | Age: 63
End: 2023-05-12
Payer: MEDICAID

## 2023-05-12 VITALS
WEIGHT: 162.6 LBS | BODY MASS INDEX: 26.13 KG/M2 | DIASTOLIC BLOOD PRESSURE: 85 MMHG | HEART RATE: 71 BPM | OXYGEN SATURATION: 96 % | SYSTOLIC BLOOD PRESSURE: 170 MMHG | HEIGHT: 66 IN

## 2023-05-12 DIAGNOSIS — I25.810 CORONARY ARTERY DISEASE INVOLVING CORONARY BYPASS GRAFT OF NATIVE HEART WITHOUT ANGINA PECTORIS: ICD-10-CM

## 2023-05-12 DIAGNOSIS — I50.20 HEART FAILURE WITH REDUCED EJECTION FRACTION: ICD-10-CM

## 2023-05-12 DIAGNOSIS — Z95.810 PRESENCE OF EXTERNAL CARDIAC DEFIBRILLATOR: ICD-10-CM

## 2023-05-12 DIAGNOSIS — J44.9 CHRONIC OBSTRUCTIVE PULMONARY DISEASE, UNSPECIFIED COPD TYPE: ICD-10-CM

## 2023-05-12 DIAGNOSIS — I25.5 ISCHEMIC CARDIOMYOPATHY: Primary | ICD-10-CM

## 2023-05-12 DIAGNOSIS — I10 PRIMARY HYPERTENSION: ICD-10-CM

## 2023-05-12 RX ORDER — ATORVASTATIN CALCIUM 40 MG/1
80 TABLET, FILM COATED ORAL DAILY
Qty: 90 TABLET | Refills: 0
Start: 2023-05-12

## 2023-05-12 RX ORDER — SACUBITRIL AND VALSARTAN 49; 51 MG/1; MG/1
1 TABLET, FILM COATED ORAL 2 TIMES DAILY
Qty: 28 TABLET | Refills: 0 | COMMUNITY
Start: 2023-05-12

## 2023-05-12 NOTE — PROGRESS NOTES
Subjective     Florencio Cintron is a 62 y.o. male.   Chief Complaint   Patient presents with   • Heart Problem     Pt here for follow up      History of Present Illness   Florencio Cintron is a 62-year-old male who presents to the clinic today for cardiology follow-up.  He is accompanied by his significant other, Landy.     Ischemic cardiomyopathy and heart failure with reduced ejection fraction recently transitioned to Entresto and kidney function has remained stable.  BP has remained slightly elevated.  He remains on labetalol 200 mg twice daily.  He reports breathing has been stable, denies worsening orthopnea or swelling.  He reports he has not used alcohol recently.    Multivessel CAD awaiting cardiothoracic surgeon evaluation.  He remains on aspirin, Imdur, Plavix and Lipitor.  He reports tolerating medications well and denies medicine side effects.  He denies chest pain.     COPD with recent evaluation with pulmonology and changes to inhalers.  He has been prescribed oxygen however he is not using it at nighttime.  He is a former smoker, quit about 3 years ago.    Patient Active Problem List   Diagnosis   • Ischemic cardiomyopathy   • Heart failure with reduced ejection fraction   • Coronary artery disease involving coronary bypass graft of native heart without angina pectoris   • Presence of external cardiac defibrillator   • Primary hypertension   • Chronic obstructive pulmonary disease     Past Medical History:   Diagnosis Date   • Chronic kidney disease    • COPD (chronic obstructive pulmonary disease)    • Hypertension    • Myocardial infarction    • Stroke      Past Surgical History:   Procedure Laterality Date   • APPENDECTOMY     • TONSILLECTOMY         Family History   Problem Relation Age of Onset   • Heart disease Mother      Social History     Tobacco Use   • Smoking status: Former     Packs/day: 2.00     Years: 40.00     Pack years: 80.00     Types: Cigarettes     Quit date: 4/1/2020     Years since  quitting: 3.1   • Smokeless tobacco: Never   Substance Use Topics   • Alcohol use: Yes     Comment: qnce a week   • Drug use: Not Currently     The following portions of the patient's history were reviewed and updated as appropriate: allergies, current medications, past family history, past medical history, past social history, past surgical history and problem list.    No Known Allergies      Current Outpatient Medications:   •  Aspirin Low Dose 81 MG EC tablet, Take 1 tablet by mouth Daily., Disp: , Rfl:   •  atorvastatin (LIPITOR) 40 MG tablet, Take 2 tablets by mouth Daily. for cholesterol, Disp: 90 tablet, Rfl: 0  •  Budeson-Glycopyrrol-Formoterol (Breztri Aerosphere) 160-9-4.8 MCG/ACT aerosol inhaler, Inhale 2 puffs 2 (Two) Times a Day., Disp: 10.7 g, Rfl: 5  •  clopidogrel (PLAVIX) 75 MG tablet, Take 1 tablet by mouth Daily., Disp: 30 tablet, Rfl: 1  •  doxycycline (MONODOX) 100 MG capsule, TAKE ONE CAPSULE BY MOUTH TWO TIMES A DAY FOR 10 DAYS FOR INFECTION **FINISH ALL OF THIS MEDICATION**, Disp: , Rfl:   •  Earwax Removal 6.5 % otic solution, INSTILL 4 DROPS IN AFFECTED EAR(S) TWO TIMES A DAY FOR 5 DAYS, Disp: , Rfl:   •  isosorbide mononitrate (IMDUR) 30 MG 24 hr tablet, Take 1 tablet by mouth Daily., Disp: 30 tablet, Rfl: 1  •  labetalol (NORMODYNE) 200 MG tablet, TAKE ONE TABLET BY MOUTH TWO TIMES A DAY FOR BLOOD PRESSURE, Disp: , Rfl:   •  tamsulosin (FLOMAX) 0.4 MG capsule 24 hr capsule, TAKE ONE CAPSULE BY MOUTH DAILY FOR PROSTATE, Disp: , Rfl:   •  Ventolin  (90 Base) MCG/ACT inhaler, INHALE TWO PUFFS BY MOUTH EVERY 4 TO 6 HOURS AS NEEDED FOR SHORTNESS OF BREATH AND/OR WHEEZING, Disp: , Rfl:   •  sacubitril-valsartan (Entresto) 49-51 MG tablet, Take 1 tablet by mouth 2 (Two) Times a Day., Disp: 28 tablet, Rfl: 0    Review of Systems   Constitutional: Negative for activity change, appetite change, chills, diaphoresis, fatigue and fever.   HENT: Negative for congestion, drooling, ear discharge,  "ear pain, mouth sores, nosebleeds, postnasal drip, rhinorrhea, sinus pressure, sneezing and sore throat.    Eyes: Negative for pain, discharge and visual disturbance.   Respiratory: Positive for shortness of breath. Negative for cough, chest tightness and wheezing.    Cardiovascular: Negative for chest pain, palpitations and leg swelling.   Gastrointestinal: Negative for abdominal pain, constipation, diarrhea, nausea and vomiting.   Endocrine: Negative for cold intolerance, heat intolerance, polydipsia, polyphagia and polyuria.   Musculoskeletal: Negative for arthralgias, myalgias and neck pain.   Skin: Negative for rash and wound.   Neurological: Negative for syncope, speech difficulty, weakness, light-headedness and headaches.   Hematological: Negative for adenopathy. Does not bruise/bleed easily.   Psychiatric/Behavioral: Negative for confusion, dysphoric mood and sleep disturbance. The patient is not nervous/anxious.    All other systems reviewed and are negative.    /85 (BP Location: Left arm, Patient Position: Sitting, Cuff Size: Adult)   Pulse 71   Ht 167.6 cm (66\")   Wt 73.8 kg (162 lb 9.6 oz)   SpO2 96%   BMI 26.24 kg/m²     Objective   No Known Allergies    Physical Exam  Vitals reviewed.   Constitutional:       Appearance: Normal appearance. He is well-developed.   HENT:      Head: Normocephalic.   Eyes:      Conjunctiva/sclera: Conjunctivae normal.   Neck:      Thyroid: No thyromegaly.      Vascular: No carotid bruit or JVD.   Cardiovascular:      Rate and Rhythm: Normal rate and regular rhythm.      Comments: LifeVest on   Pulmonary:      Effort: Pulmonary effort is normal.      Breath sounds: Normal breath sounds.   Musculoskeletal:      Cervical back: Neck supple.      Right lower leg: No edema.      Left lower leg: No edema.   Skin:     General: Skin is warm and dry.   Neurological:      Mental Status: He is alert and oriented to person, place, and time.   Psychiatric:         Attention " and Perception: Attention normal.         Mood and Affect: Mood normal.         Speech: Speech normal.         Behavior: Behavior normal. Behavior is cooperative.         Cognition and Memory: Cognition normal.           LABS  WBC   Date Value Ref Range Status   05/14/2017 7.56 4.50 - 12.50 10*3/mm3 Final     RBC   Date Value Ref Range Status   05/14/2017 5.12 4.70 - 6.10 10*6/mm3 Final     Hemoglobin   Date Value Ref Range Status   05/14/2017 15.7 14.0 - 18.0 g/dL Final     Hematocrit   Date Value Ref Range Status   05/14/2017 45.0 42.0 - 52.0 % Final     MCV   Date Value Ref Range Status   05/14/2017 87.9 80.0 - 94.0 fL Final     MCH   Date Value Ref Range Status   05/14/2017 30.7 27.0 - 33.0 pg Final     MCHC   Date Value Ref Range Status   05/14/2017 34.9 33.0 - 37.0 g/dL Final     RDW   Date Value Ref Range Status   05/14/2017 13.6 11.5 - 14.5 % Final     RDW-SD   Date Value Ref Range Status   05/14/2017 43.0 37.0 - 54.0 fl Final     MPV   Date Value Ref Range Status   05/14/2017 7.9 6.0 - 10.0 fL Final     Platelets   Date Value Ref Range Status   05/14/2017 300 130 - 400 10*3/mm3 Final     Neutrophil %   Date Value Ref Range Status   05/14/2017 52.1 30.0 - 70.0 % Final     Lymphocyte %   Date Value Ref Range Status   05/14/2017 35.6 21.0 - 51.0 % Final     Monocyte %   Date Value Ref Range Status   05/14/2017 8.3 0.0 - 10.0 % Final     Eosinophil %   Date Value Ref Range Status   05/14/2017 3.0 0.0 - 5.0 % Final     Basophil %   Date Value Ref Range Status   05/14/2017 0.9 0.0 - 2.0 % Final     Immature Grans %   Date Value Ref Range Status   05/14/2017 0.1 0.0 - 0.5 % Final     Neutrophils, Absolute   Date Value Ref Range Status   05/14/2017 3.93 1.40 - 6.50 10*3/mm3 Final     Lymphocytes, Absolute   Date Value Ref Range Status   05/14/2017 2.69 1.00 - 3.00 10*3/mm3 Final     Monocytes, Absolute   Date Value Ref Range Status   05/14/2017 0.63 0.10 - 0.90 10*3/mm3 Final     Eosinophils, Absolute   Date Value  Ref Range Status   05/14/2017 0.23 0.00 - 0.70 10*3/mm3 Final     Basophils, Absolute   Date Value Ref Range Status   05/14/2017 0.07 0.00 - 0.30 10*3/mm3 Final     Immature Grans, Absolute   Date Value Ref Range Status   05/14/2017 0.01 0.00 - 0.03 10*3/mm3 Final               No results found for: CHOL, TRIG, HDL, LDL    IMAGING  XR Chest 1 View    Result Date: 3/29/2023  No acute cardiopulmonary process. Continued follow-up recommended. Images reviewed, interpreted, and dictated by Dr. SANKET Car. Transcribed by IVONE Hess (R).    CT chest without IV contrast    Result Date: 4/1/2023  Small bilateral pleural effusions with lung base atelectasis. Coronary artery disease and cardiomegaly. Images personally reviewed, interpreted and dictated by BIBIANA Cavanaugh M.D.    Ultrasound Kidneys Bilateral    Result Date: 3/30/2023  No evidence of obstructive uropathy. Small amount of perihepatic and perinephric free fluid/small volume ascites. Questionable hepatic parenchymal disease/cirrhosis. Correlate clinically. Images personally reviewed, interpreted and dictated by THIAGO Ashraf.            Assessment & Plan   Diagnoses and all orders for this visit:    1. Ischemic cardiomyopathy (Primary)  Increase Entresto with close monitoring of BP and kidney function.  Continue on labetalol, and BP is controlled we will plan to transition to Coreg.  Advance guideline directed medical therapy as patient tolerates with the addition of SGLT2, MRA and Verquvo.     2. Heart failure with reduced ejection fraction  -     Basic Metabolic Panel; Future  -     BNP; Future  Stable, continue to monitor.  Continue on Entresto and labetalol.  Will plan to transition to Coreg when BP is stable.  Sodium restriction, daily weights recommended    3. Coronary artery disease involving coronary bypass graft of native heart without angina pectoris  Continue on aspirin, Plavix, Imdur, statin and beta-blocker.  Recommend  increasing Lipitor to 80 mg nightly, monitor for myalgias    4. Primary hypertension  Elevated, will increase Entresto and continue to monitor BP, sodium restrictions encouraged    5. Presence of external cardiac defibrillator  Encouraged in compliance, discussed risk and benefits.  He denies any recent events or discharges    6. Chronic obstructive pulmonary disease, unspecified COPD type  Encourage in compliance with inhalers and oxygen therapy, continue to follow with pulmonology    Other orders  -     sacubitril-valsartan (Entresto) 49-51 MG tablet; Take 1 tablet by mouth 2 (Two) Times a Day.  Dispense: 28 tablet; Refill: 0  -     atorvastatin (LIPITOR) 40 MG tablet; Take 2 tablets by mouth Daily. for cholesterol  Dispense: 90 tablet; Refill: 0            Follow-up in 1 week for reevaluation, sooner if needed

## 2023-05-12 NOTE — LETTER
May 12, 2023     OLIVIA Christopher  [address]  [address]  [place] [address]    Patient: Florencio Cintron   YOB: 1960   Date of Visit: 5/12/2023       Dear OLIVIA Christopher    Florencio Cintron was in my office today. Below is a copy of my note.    If you have questions, please do not hesitate to call me. I look forward to following Florencio along with you.         Sincerely,        OLIVIA Barone        CC: No Recipients    Subjective     Florencio Cintron is a 62 y.o. male.   Chief Complaint   Patient presents with   • Heart Problem     Pt here for follow up      History of Present Illness   Florencio Cintron is a 62-year-old male who presents to the clinic today for cardiology follow-up.  He is accompanied by his significant other, Landy.     Ischemic cardiomyopathy and heart failure with reduced ejection fraction recently transitioned to Entresto and kidney function has remained stable.  BP has remained slightly elevated.  He remains on labetalol 200 mg twice daily.  He reports breathing has been stable, denies worsening orthopnea or swelling.  He reports he has not used alcohol recently.    Multivessel CAD awaiting cardiothoracic surgeon evaluation.  He remains on aspirin, Imdur, Plavix and Lipitor.  He reports tolerating medications well and denies medicine side effects.  He denies chest pain.     COPD with recent evaluation with pulmonology and changes to inhalers.  He has been prescribed oxygen however he is not using it at nighttime.  He is a former smoker, quit about 3 years ago.    Patient Active Problem List   Diagnosis   • Ischemic cardiomyopathy   • Heart failure with reduced ejection fraction   • Coronary artery disease involving coronary bypass graft of native heart without angina pectoris   • Presence of external cardiac defibrillator   • Primary hypertension   • Chronic obstructive pulmonary disease     Past Medical History:   Diagnosis Date   • Chronic kidney disease    • COPD (chronic  obstructive pulmonary disease)    • Hypertension    • Myocardial infarction    • Stroke      Past Surgical History:   Procedure Laterality Date   • APPENDECTOMY     • TONSILLECTOMY         Family History   Problem Relation Age of Onset   • Heart disease Mother      Social History     Tobacco Use   • Smoking status: Former     Packs/day: 2.00     Years: 40.00     Pack years: 80.00     Types: Cigarettes     Quit date: 4/1/2020     Years since quitting: 3.1   • Smokeless tobacco: Never   Substance Use Topics   • Alcohol use: Yes     Comment: qnce a week   • Drug use: Not Currently     The following portions of the patient's history were reviewed and updated as appropriate: allergies, current medications, past family history, past medical history, past social history, past surgical history and problem list.    No Known Allergies      Current Outpatient Medications:   •  Aspirin Low Dose 81 MG EC tablet, Take 1 tablet by mouth Daily., Disp: , Rfl:   •  atorvastatin (LIPITOR) 40 MG tablet, Take 2 tablets by mouth Daily. for cholesterol, Disp: 90 tablet, Rfl: 0  •  Budeson-Glycopyrrol-Formoterol (Breztri Aerosphere) 160-9-4.8 MCG/ACT aerosol inhaler, Inhale 2 puffs 2 (Two) Times a Day., Disp: 10.7 g, Rfl: 5  •  clopidogrel (PLAVIX) 75 MG tablet, Take 1 tablet by mouth Daily., Disp: 30 tablet, Rfl: 1  •  doxycycline (MONODOX) 100 MG capsule, TAKE ONE CAPSULE BY MOUTH TWO TIMES A DAY FOR 10 DAYS FOR INFECTION **FINISH ALL OF THIS MEDICATION**, Disp: , Rfl:   •  Earwax Removal 6.5 % otic solution, INSTILL 4 DROPS IN AFFECTED EAR(S) TWO TIMES A DAY FOR 5 DAYS, Disp: , Rfl:   •  isosorbide mononitrate (IMDUR) 30 MG 24 hr tablet, Take 1 tablet by mouth Daily., Disp: 30 tablet, Rfl: 1  •  labetalol (NORMODYNE) 200 MG tablet, TAKE ONE TABLET BY MOUTH TWO TIMES A DAY FOR BLOOD PRESSURE, Disp: , Rfl:   •  tamsulosin (FLOMAX) 0.4 MG capsule 24 hr capsule, TAKE ONE CAPSULE BY MOUTH DAILY FOR PROSTATE, Disp: , Rfl:   •  Ventolin HFA  "108 (90 Base) MCG/ACT inhaler, INHALE TWO PUFFS BY MOUTH EVERY 4 TO 6 HOURS AS NEEDED FOR SHORTNESS OF BREATH AND/OR WHEEZING, Disp: , Rfl:   •  sacubitril-valsartan (Entresto) 49-51 MG tablet, Take 1 tablet by mouth 2 (Two) Times a Day., Disp: 28 tablet, Rfl: 0    Review of Systems   Constitutional: Negative for activity change, appetite change, chills, diaphoresis, fatigue and fever.   HENT: Negative for congestion, drooling, ear discharge, ear pain, mouth sores, nosebleeds, postnasal drip, rhinorrhea, sinus pressure, sneezing and sore throat.    Eyes: Negative for pain, discharge and visual disturbance.   Respiratory: Positive for shortness of breath. Negative for cough, chest tightness and wheezing.    Cardiovascular: Negative for chest pain, palpitations and leg swelling.   Gastrointestinal: Negative for abdominal pain, constipation, diarrhea, nausea and vomiting.   Endocrine: Negative for cold intolerance, heat intolerance, polydipsia, polyphagia and polyuria.   Musculoskeletal: Negative for arthralgias, myalgias and neck pain.   Skin: Negative for rash and wound.   Neurological: Negative for syncope, speech difficulty, weakness, light-headedness and headaches.   Hematological: Negative for adenopathy. Does not bruise/bleed easily.   Psychiatric/Behavioral: Negative for confusion, dysphoric mood and sleep disturbance. The patient is not nervous/anxious.    All other systems reviewed and are negative.    /85 (BP Location: Left arm, Patient Position: Sitting, Cuff Size: Adult)   Pulse 71   Ht 167.6 cm (66\")   Wt 73.8 kg (162 lb 9.6 oz)   SpO2 96%   BMI 26.24 kg/m²     Objective   No Known Allergies    Physical Exam  Vitals reviewed.   Constitutional:       Appearance: Normal appearance. He is well-developed.   HENT:      Head: Normocephalic.   Eyes:      Conjunctiva/sclera: Conjunctivae normal.   Neck:      Thyroid: No thyromegaly.      Vascular: No carotid bruit or JVD.   Cardiovascular:      Rate " and Rhythm: Normal rate and regular rhythm.      Comments: LifeVest on   Pulmonary:      Effort: Pulmonary effort is normal.      Breath sounds: Normal breath sounds.   Musculoskeletal:      Cervical back: Neck supple.      Right lower leg: No edema.      Left lower leg: No edema.   Skin:     General: Skin is warm and dry.   Neurological:      Mental Status: He is alert and oriented to person, place, and time.   Psychiatric:         Attention and Perception: Attention normal.         Mood and Affect: Mood normal.         Speech: Speech normal.         Behavior: Behavior normal. Behavior is cooperative.         Cognition and Memory: Cognition normal.           LABS  WBC   Date Value Ref Range Status   05/14/2017 7.56 4.50 - 12.50 10*3/mm3 Final     RBC   Date Value Ref Range Status   05/14/2017 5.12 4.70 - 6.10 10*6/mm3 Final     Hemoglobin   Date Value Ref Range Status   05/14/2017 15.7 14.0 - 18.0 g/dL Final     Hematocrit   Date Value Ref Range Status   05/14/2017 45.0 42.0 - 52.0 % Final     MCV   Date Value Ref Range Status   05/14/2017 87.9 80.0 - 94.0 fL Final     MCH   Date Value Ref Range Status   05/14/2017 30.7 27.0 - 33.0 pg Final     MCHC   Date Value Ref Range Status   05/14/2017 34.9 33.0 - 37.0 g/dL Final     RDW   Date Value Ref Range Status   05/14/2017 13.6 11.5 - 14.5 % Final     RDW-SD   Date Value Ref Range Status   05/14/2017 43.0 37.0 - 54.0 fl Final     MPV   Date Value Ref Range Status   05/14/2017 7.9 6.0 - 10.0 fL Final     Platelets   Date Value Ref Range Status   05/14/2017 300 130 - 400 10*3/mm3 Final     Neutrophil %   Date Value Ref Range Status   05/14/2017 52.1 30.0 - 70.0 % Final     Lymphocyte %   Date Value Ref Range Status   05/14/2017 35.6 21.0 - 51.0 % Final     Monocyte %   Date Value Ref Range Status   05/14/2017 8.3 0.0 - 10.0 % Final     Eosinophil %   Date Value Ref Range Status   05/14/2017 3.0 0.0 - 5.0 % Final     Basophil %   Date Value Ref Range Status   05/14/2017  0.9 0.0 - 2.0 % Final     Immature Grans %   Date Value Ref Range Status   05/14/2017 0.1 0.0 - 0.5 % Final     Neutrophils, Absolute   Date Value Ref Range Status   05/14/2017 3.93 1.40 - 6.50 10*3/mm3 Final     Lymphocytes, Absolute   Date Value Ref Range Status   05/14/2017 2.69 1.00 - 3.00 10*3/mm3 Final     Monocytes, Absolute   Date Value Ref Range Status   05/14/2017 0.63 0.10 - 0.90 10*3/mm3 Final     Eosinophils, Absolute   Date Value Ref Range Status   05/14/2017 0.23 0.00 - 0.70 10*3/mm3 Final     Basophils, Absolute   Date Value Ref Range Status   05/14/2017 0.07 0.00 - 0.30 10*3/mm3 Final     Immature Grans, Absolute   Date Value Ref Range Status   05/14/2017 0.01 0.00 - 0.03 10*3/mm3 Final               No results found for: CHOL, TRIG, HDL, LDL    IMAGING  XR Chest 1 View    Result Date: 3/29/2023  No acute cardiopulmonary process. Continued follow-up recommended. Images reviewed, interpreted, and dictated by Dr. SANKET Car. Transcribed by IVONE Hess (R).    CT chest without IV contrast    Result Date: 4/1/2023  Small bilateral pleural effusions with lung base atelectasis. Coronary artery disease and cardiomegaly. Images personally reviewed, interpreted and dictated by BIBIANA Cavanaugh M.D.    Ultrasound Kidneys Bilateral    Result Date: 3/30/2023  No evidence of obstructive uropathy. Small amount of perihepatic and perinephric free fluid/small volume ascites. Questionable hepatic parenchymal disease/cirrhosis. Correlate clinically. Images personally reviewed, interpreted and dictated by THIAGO Ashraf.            Assessment & Plan   Diagnoses and all orders for this visit:    1. Ischemic cardiomyopathy (Primary)  Increase Entresto with close monitoring of BP and kidney function.  Continue on labetalol, and BP is controlled we will plan to transition to Coreg.  Advance guideline directed medical therapy as patient tolerates with the addition of SGLT2, MRA and Verquvo.     2.  Heart failure with reduced ejection fraction  -     Basic Metabolic Panel; Future  -     BNP; Future  Stable, continue to monitor.  Continue on Entresto and labetalol.  Will plan to transition to Coreg when BP is stable.  Sodium restriction, daily weights recommended    3. Coronary artery disease involving coronary bypass graft of native heart without angina pectoris  Continue on aspirin, Plavix, Imdur, statin and beta-blocker.  Recommend increasing Lipitor to 80 mg nightly, monitor for myalgias    4. Primary hypertension  Elevated, will increase Entresto and continue to monitor BP, sodium restrictions encouraged    5. Presence of external cardiac defibrillator  Encouraged in compliance, discussed risk and benefits.  He denies any recent events or discharges    6. Chronic obstructive pulmonary disease, unspecified COPD type  Encourage in compliance with inhalers and oxygen therapy, continue to follow with pulmonology    Other orders  -     sacubitril-valsartan (Entresto) 49-51 MG tablet; Take 1 tablet by mouth 2 (Two) Times a Day.  Dispense: 28 tablet; Refill: 0  -     atorvastatin (LIPITOR) 40 MG tablet; Take 2 tablets by mouth Daily. for cholesterol  Dispense: 90 tablet; Refill: 0           Follow-up in 1 week for reevaluation, sooner if needed

## 2023-05-19 ENCOUNTER — LAB (OUTPATIENT)
Dept: LAB | Facility: HOSPITAL | Age: 63
End: 2023-05-19
Payer: MEDICAID

## 2023-05-19 DIAGNOSIS — I50.20 HEART FAILURE WITH REDUCED EJECTION FRACTION: ICD-10-CM

## 2023-05-19 LAB
ANION GAP SERPL CALCULATED.3IONS-SCNC: 10.9 MMOL/L (ref 5–15)
BUN SERPL-MCNC: 34 MG/DL (ref 8–23)
BUN/CREAT SERPL: 22.5 (ref 7–25)
CALCIUM SPEC-SCNC: 9.2 MG/DL (ref 8.6–10.5)
CHLORIDE SERPL-SCNC: 106 MMOL/L (ref 98–107)
CO2 SERPL-SCNC: 23.1 MMOL/L (ref 22–29)
CREAT SERPL-MCNC: 1.51 MG/DL (ref 0.76–1.27)
EGFRCR SERPLBLD CKD-EPI 2021: 52.2 ML/MIN/1.73
GLUCOSE SERPL-MCNC: 81 MG/DL (ref 65–99)
NT-PROBNP SERPL-MCNC: 587 PG/ML (ref 0–900)
POTASSIUM SERPL-SCNC: 4.1 MMOL/L (ref 3.5–5.2)
SODIUM SERPL-SCNC: 140 MMOL/L (ref 136–145)

## 2023-05-19 PROCEDURE — 83880 ASSAY OF NATRIURETIC PEPTIDE: CPT

## 2023-05-19 PROCEDURE — 80048 BASIC METABOLIC PNL TOTAL CA: CPT

## 2023-05-22 ENCOUNTER — TELEPHONE (OUTPATIENT)
Dept: CARDIOLOGY | Facility: CLINIC | Age: 63
End: 2023-05-22

## 2023-05-22 NOTE — TELEPHONE ENCOUNTER
Called pt, no no longer his.  Called and left a message with daugther to return call regarding labs.   Called sig other, no answer.

## 2023-05-22 NOTE — PROGRESS NOTES
Called numerous times today no answer at no numbers listed no vm regarding   Kidney function is stable on Entresto - will plan to continue.   BNP has improved  per Elinor Luu

## 2023-05-22 NOTE — TELEPHONE ENCOUNTER
----- Message from OLIVIA Merino sent at 5/22/2023  8:32 AM EDT -----  Kidney function is stable on Entresto - will plan to continue. How is his BP?  BNP has improved

## 2023-05-22 NOTE — TELEPHONE ENCOUNTER
called pt to inform him of the following no answer no vm called all numbers all day today.   regarding Elinor Kaye APRN    Echocardiogram was ok with a normal LVEF.   Holter monitor without significant arrhythmia   Will further discuss at follow up appt.

## 2023-05-26 ENCOUNTER — OFFICE VISIT (OUTPATIENT)
Dept: CARDIOLOGY | Facility: CLINIC | Age: 63
End: 2023-05-26
Payer: MEDICAID

## 2023-05-26 VITALS
BODY MASS INDEX: 26.68 KG/M2 | WEIGHT: 166 LBS | DIASTOLIC BLOOD PRESSURE: 62 MMHG | SYSTOLIC BLOOD PRESSURE: 125 MMHG | OXYGEN SATURATION: 96 % | HEART RATE: 78 BPM | HEIGHT: 66 IN

## 2023-05-26 DIAGNOSIS — I25.5 ISCHEMIC CARDIOMYOPATHY: Primary | ICD-10-CM

## 2023-05-26 DIAGNOSIS — I50.20 HEART FAILURE WITH REDUCED EJECTION FRACTION: ICD-10-CM

## 2023-05-26 DIAGNOSIS — I25.810 CORONARY ARTERY DISEASE INVOLVING CORONARY BYPASS GRAFT OF NATIVE HEART WITHOUT ANGINA PECTORIS: ICD-10-CM

## 2023-05-26 DIAGNOSIS — Z95.810 PRESENCE OF EXTERNAL CARDIAC DEFIBRILLATOR: ICD-10-CM

## 2023-05-26 DIAGNOSIS — I10 PRIMARY HYPERTENSION: ICD-10-CM

## 2023-05-26 RX ORDER — CARVEDILOL 25 MG/1
25 TABLET ORAL 2 TIMES DAILY
Qty: 60 TABLET | Refills: 2 | Status: SHIPPED | OUTPATIENT
Start: 2023-05-26

## 2023-05-26 RX ORDER — SACUBITRIL AND VALSARTAN 49; 51 MG/1; MG/1
1 TABLET, FILM COATED ORAL 2 TIMES DAILY
Qty: 60 TABLET | Refills: 2 | Status: SHIPPED | OUTPATIENT
Start: 2023-05-26

## 2023-05-26 NOTE — LETTER
June 18, 2023     OLIVIA Christopher  [address]  [address]  [facility] [address]    Patient: Florencio Cintron   YOB: 1960   Date of Visit: 5/26/2023       Dear OLIVIA Christopher    Florencio Cintron was in my office today. Below is a copy of my note.    If you have questions, please do not hesitate to call me. I look forward to following Florencio along with you.         Sincerely,        OLIVIA Barone        CC: No Recipients    Subjective     Florencio Cintron is a 62 y.o. male.   Chief Complaint   Patient presents with   • Results     Labs     • Cardiomyopathy     Follow up     History of Present Illness   Florencio Cintron is a 62 y.o. male who presents to the clinic today for cardiology follow up. He is accompanied by his significant other, Landy.     Ischemic cardiomyopathy and heart failure with reduced ejection fraction currently on Entresto and kidney function has remained stable.  BP has improved. He remains on labetalol 200 mg twice daily.  He reports breathing has been stable, denies worsening orthopnea or swelling. He reports he is using his LifeVest without any alarms or discharges.      Multivessel CAD awaiting cardiothoracic surgeon evaluation.  He remains on aspirin, Imdur, Plavix and Lipitor.  He reports tolerating medications well and denies medicine side effects.  He denies chest pain or bleeding.      COPD with recent evaluation with pulmonology and changes to inhalers.  He has been prescribed oxygen however he is not using it at nighttime.  He is a former smoker, quit about 3 years ago.      Patient Active Problem List   Diagnosis   • Ischemic cardiomyopathy   • Heart failure with reduced ejection fraction   • Coronary artery disease involving coronary bypass graft of native heart without angina pectoris   • Presence of external cardiac defibrillator   • Primary hypertension   • Chronic obstructive pulmonary disease     Past Medical History:   Diagnosis Date   • Chronic kidney disease     • COPD (chronic obstructive pulmonary disease)    • Hypertension    • Myocardial infarction    • Stroke      Past Surgical History:   Procedure Laterality Date   • APPENDECTOMY     • TONSILLECTOMY         Family History   Problem Relation Age of Onset   • Heart disease Mother      Social History     Tobacco Use   • Smoking status: Former     Packs/day: 2.00     Years: 40.00     Pack years: 80.00     Types: Cigarettes     Quit date: 4/1/2020     Years since quitting: 3.2   • Smokeless tobacco: Never   Substance Use Topics   • Alcohol use: Yes     Comment: qnce a week   • Drug use: Not Currently         The following portions of the patient's history were reviewed and updated as appropriate: allergies, current medications, past family history, past medical history, past social history, past surgical history and problem list.    No Known Allergies      Current Outpatient Medications:   •  Aspirin Low Dose 81 MG EC tablet, Take 1 tablet by mouth Daily., Disp: , Rfl:   •  atorvastatin (LIPITOR) 40 MG tablet, Take 2 tablets by mouth Daily. for cholesterol, Disp: 90 tablet, Rfl: 0  •  Budeson-Glycopyrrol-Formoterol (Breztri Aerosphere) 160-9-4.8 MCG/ACT aerosol inhaler, Inhale 2 puffs 2 (Two) Times a Day., Disp: 10.7 g, Rfl: 5  •  clopidogrel (PLAVIX) 75 MG tablet, Take 1 tablet by mouth Daily., Disp: 30 tablet, Rfl: 1  •  Earwax Removal 6.5 % otic solution, INSTILL 4 DROPS IN AFFECTED EAR(S) TWO TIMES A DAY FOR 5 DAYS, Disp: , Rfl:   •  isosorbide mononitrate (IMDUR) 30 MG 24 hr tablet, Take 1 tablet by mouth Daily., Disp: 30 tablet, Rfl: 1  •  sacubitril-valsartan (Entresto) 49-51 MG tablet, Take 1 tablet by mouth 2 (Two) Times a Day., Disp: 60 tablet, Rfl: 2  •  tamsulosin (FLOMAX) 0.4 MG capsule 24 hr capsule, TAKE ONE CAPSULE BY MOUTH DAILY FOR PROSTATE, Disp: , Rfl:   •  Ventolin  (90 Base) MCG/ACT inhaler, INHALE TWO PUFFS BY MOUTH EVERY 4 TO 6 HOURS AS NEEDED FOR SHORTNESS OF BREATH AND/OR WHEEZING, Disp:  ", Rfl:   •  carvedilol (COREG) 25 MG tablet, Take 1 tablet by mouth 2 (Two) Times a Day., Disp: 60 tablet, Rfl: 2    Review of Systems   Constitutional: Negative for activity change, appetite change, chills, diaphoresis, fatigue and fever.   HENT: Negative for congestion, drooling, ear discharge, ear pain, mouth sores, nosebleeds, postnasal drip, rhinorrhea, sinus pressure, sneezing and sore throat.    Eyes: Negative for pain, discharge and visual disturbance.   Respiratory: Negative for cough, chest tightness, shortness of breath and wheezing.    Cardiovascular: Negative for chest pain, palpitations and leg swelling.   Gastrointestinal: Negative for abdominal pain, constipation, diarrhea, nausea and vomiting.   Endocrine: Negative for cold intolerance, heat intolerance, polydipsia, polyphagia and polyuria.   Musculoskeletal: Negative for arthralgias, myalgias and neck pain.   Skin: Negative for rash and wound.   Neurological: Negative for dizziness, syncope, speech difficulty, weakness, light-headedness and headaches.   Hematological: Negative for adenopathy. Does not bruise/bleed easily.   Psychiatric/Behavioral: Negative for confusion, dysphoric mood and sleep disturbance. The patient is not nervous/anxious.    All other systems reviewed and are negative.    /62 (BP Location: Left arm, Patient Position: Sitting, Cuff Size: Adult)   Pulse 78   Ht 167.6 cm (66\")   Wt 75.3 kg (166 lb)   SpO2 96%   BMI 26.79 kg/m²     Objective   No Known Allergies    Physical Exam  Vitals reviewed.   Constitutional:       Appearance: Normal appearance. He is well-developed.   HENT:      Head: Normocephalic.   Eyes:      General: Lids are everted, no foreign bodies appreciated.      Conjunctiva/sclera: Conjunctivae normal.   Neck:      Thyroid: No thyromegaly.      Vascular: No carotid bruit or JVD.   Cardiovascular:      Rate and Rhythm: Normal rate and regular rhythm.      Comments: Life vest on  Pulmonary:      Effort: " Pulmonary effort is normal.      Breath sounds: Normal breath sounds.   Musculoskeletal:      Cervical back: Neck supple.      Right lower leg: No edema.      Left lower leg: No edema.   Skin:     General: Skin is warm and dry.   Neurological:      Mental Status: He is alert and oriented to person, place, and time.   Psychiatric:         Attention and Perception: Attention normal.         Mood and Affect: Mood normal.         Speech: Speech normal.         Behavior: Behavior normal. Behavior is cooperative.         Cognition and Memory: Cognition normal.           LABS  WBC   Date Value Ref Range Status   05/14/2017 7.56 4.50 - 12.50 10*3/mm3 Final     RBC   Date Value Ref Range Status   05/14/2017 5.12 4.70 - 6.10 10*6/mm3 Final     Hemoglobin   Date Value Ref Range Status   05/14/2017 15.7 14.0 - 18.0 g/dL Final     Hematocrit   Date Value Ref Range Status   05/14/2017 45.0 42.0 - 52.0 % Final     MCV   Date Value Ref Range Status   05/14/2017 87.9 80.0 - 94.0 fL Final     MCH   Date Value Ref Range Status   05/14/2017 30.7 27.0 - 33.0 pg Final     MCHC   Date Value Ref Range Status   05/14/2017 34.9 33.0 - 37.0 g/dL Final     RDW   Date Value Ref Range Status   05/14/2017 13.6 11.5 - 14.5 % Final     RDW-SD   Date Value Ref Range Status   05/14/2017 43.0 37.0 - 54.0 fl Final     MPV   Date Value Ref Range Status   05/14/2017 7.9 6.0 - 10.0 fL Final     Platelets   Date Value Ref Range Status   05/14/2017 300 130 - 400 10*3/mm3 Final     Neutrophil %   Date Value Ref Range Status   05/14/2017 52.1 30.0 - 70.0 % Final     Lymphocyte %   Date Value Ref Range Status   05/14/2017 35.6 21.0 - 51.0 % Final     Monocyte %   Date Value Ref Range Status   05/14/2017 8.3 0.0 - 10.0 % Final     Eosinophil %   Date Value Ref Range Status   05/14/2017 3.0 0.0 - 5.0 % Final     Basophil %   Date Value Ref Range Status   05/14/2017 0.9 0.0 - 2.0 % Final     Immature Grans %   Date Value Ref Range Status   05/14/2017 0.1 0.0 -  0.5 % Final     Neutrophils, Absolute   Date Value Ref Range Status   05/14/2017 3.93 1.40 - 6.50 10*3/mm3 Final     Lymphocytes, Absolute   Date Value Ref Range Status   05/14/2017 2.69 1.00 - 3.00 10*3/mm3 Final     Monocytes, Absolute   Date Value Ref Range Status   05/14/2017 0.63 0.10 - 0.90 10*3/mm3 Final     Eosinophils, Absolute   Date Value Ref Range Status   05/14/2017 0.23 0.00 - 0.70 10*3/mm3 Final     Basophils, Absolute   Date Value Ref Range Status   05/14/2017 0.07 0.00 - 0.30 10*3/mm3 Final     Immature Grans, Absolute   Date Value Ref Range Status   05/14/2017 0.01 0.00 - 0.03 10*3/mm3 Final     No results found for: CHOL, TRIG, HDL, LDL    IMAGING  XR Chest 1 View    Result Date: 3/29/2023  No acute cardiopulmonary process. Continued follow-up recommended. Images reviewed, interpreted, and dictated by Dr. SANKET Car. Transcribed by IVONE Hess (R).    CT chest without IV contrast    Result Date: 4/1/2023  Small bilateral pleural effusions with lung base atelectasis. Coronary artery disease and cardiomegaly. Images personally reviewed, interpreted and dictated by BIBIANA Cavanaugh M.D.    Ultrasound Kidneys Bilateral    Result Date: 3/30/2023  No evidence of obstructive uropathy. Small amount of perihepatic and perinephric free fluid/small volume ascites. Questionable hepatic parenchymal disease/cirrhosis. Correlate clinically. Images personally reviewed, interpreted and dictated by THIAGO Ashraf.          Assessment & Plan   Diagnoses and all orders for this visit:    1. Ischemic cardiomyopathy (Primary)  Continue on Entresto, stop Labetolol and start Coreg 25 mg BID, advance GDMT as pt tolerates.     2. Heart failure with reduced ejection fraction  Stable, euvolemic, continue current medications, sodium restrictions recommended.     3. Coronary artery disease involving coronary bypass graft of native heart without angina pectoris  Continue asa, statin, plavix, imdur and beta  blocker.     4. Primary hypertension  Controlled, continue on current medicaitons    5. Presence of external cardiac defibrillator  Encourage in compliance, discussed risks and benefits. He denies any recent events or discharges.     Other orders  -     sacubitril-valsartan (Entresto) 49-51 MG tablet; Take 1 tablet by mouth 2 (Two) Times a Day.  Dispense: 60 tablet; Refill: 2  -     carvedilol (COREG) 25 MG tablet; Take 1 tablet by mouth 2 (Two) Times a Day.  Dispense: 60 tablet; Refill: 2        Reviewed labs and chart   Follow up in 1 month, sooner if needed.

## 2023-05-26 NOTE — PROGRESS NOTES
Subjective     Florencio Cintron is a 62 y.o. male.   Chief Complaint   Patient presents with   • Results     Labs     • Cardiomyopathy     Follow up     History of Present Illness   Florencio Cintron is a 62 y.o. male who presents to the clinic today for cardiology follow up. He is accompanied by his significant other, Landy.     Ischemic cardiomyopathy and heart failure with reduced ejection fraction currently on Entresto and kidney function has remained stable.  BP has improved. He remains on labetalol 200 mg twice daily.  He reports breathing has been stable, denies worsening orthopnea or swelling. He reports he is using his LifeVest without any alarms or discharges.      Multivessel CAD awaiting cardiothoracic surgeon evaluation.  He remains on aspirin, Imdur, Plavix and Lipitor.  He reports tolerating medications well and denies medicine side effects.  He denies chest pain or bleeding.      COPD with recent evaluation with pulmonology and changes to inhalers.  He has been prescribed oxygen however he is not using it at nighttime.  He is a former smoker, quit about 3 years ago.      Patient Active Problem List   Diagnosis   • Ischemic cardiomyopathy   • Heart failure with reduced ejection fraction   • Coronary artery disease involving coronary bypass graft of native heart without angina pectoris   • Presence of external cardiac defibrillator   • Primary hypertension   • Chronic obstructive pulmonary disease     Past Medical History:   Diagnosis Date   • Chronic kidney disease    • COPD (chronic obstructive pulmonary disease)    • Hypertension    • Myocardial infarction    • Stroke      Past Surgical History:   Procedure Laterality Date   • APPENDECTOMY     • TONSILLECTOMY         Family History   Problem Relation Age of Onset   • Heart disease Mother      Social History     Tobacco Use   • Smoking status: Former     Packs/day: 2.00     Years: 40.00     Pack years: 80.00     Types: Cigarettes     Quit date: 4/1/2020      Years since quitting: 3.2   • Smokeless tobacco: Never   Substance Use Topics   • Alcohol use: Yes     Comment: qnce a week   • Drug use: Not Currently         The following portions of the patient's history were reviewed and updated as appropriate: allergies, current medications, past family history, past medical history, past social history, past surgical history and problem list.    No Known Allergies      Current Outpatient Medications:   •  Aspirin Low Dose 81 MG EC tablet, Take 1 tablet by mouth Daily., Disp: , Rfl:   •  atorvastatin (LIPITOR) 40 MG tablet, Take 2 tablets by mouth Daily. for cholesterol, Disp: 90 tablet, Rfl: 0  •  Budeson-Glycopyrrol-Formoterol (Breztri Aerosphere) 160-9-4.8 MCG/ACT aerosol inhaler, Inhale 2 puffs 2 (Two) Times a Day., Disp: 10.7 g, Rfl: 5  •  clopidogrel (PLAVIX) 75 MG tablet, Take 1 tablet by mouth Daily., Disp: 30 tablet, Rfl: 1  •  Earwax Removal 6.5 % otic solution, INSTILL 4 DROPS IN AFFECTED EAR(S) TWO TIMES A DAY FOR 5 DAYS, Disp: , Rfl:   •  isosorbide mononitrate (IMDUR) 30 MG 24 hr tablet, Take 1 tablet by mouth Daily., Disp: 30 tablet, Rfl: 1  •  sacubitril-valsartan (Entresto) 49-51 MG tablet, Take 1 tablet by mouth 2 (Two) Times a Day., Disp: 60 tablet, Rfl: 2  •  tamsulosin (FLOMAX) 0.4 MG capsule 24 hr capsule, TAKE ONE CAPSULE BY MOUTH DAILY FOR PROSTATE, Disp: , Rfl:   •  Ventolin  (90 Base) MCG/ACT inhaler, INHALE TWO PUFFS BY MOUTH EVERY 4 TO 6 HOURS AS NEEDED FOR SHORTNESS OF BREATH AND/OR WHEEZING, Disp: , Rfl:   •  carvedilol (COREG) 25 MG tablet, Take 1 tablet by mouth 2 (Two) Times a Day., Disp: 60 tablet, Rfl: 2    Review of Systems   Constitutional: Negative for activity change, appetite change, chills, diaphoresis, fatigue and fever.   HENT: Negative for congestion, drooling, ear discharge, ear pain, mouth sores, nosebleeds, postnasal drip, rhinorrhea, sinus pressure, sneezing and sore throat.    Eyes: Negative for pain, discharge and visual  "disturbance.   Respiratory: Negative for cough, chest tightness, shortness of breath and wheezing.    Cardiovascular: Negative for chest pain, palpitations and leg swelling.   Gastrointestinal: Negative for abdominal pain, constipation, diarrhea, nausea and vomiting.   Endocrine: Negative for cold intolerance, heat intolerance, polydipsia, polyphagia and polyuria.   Musculoskeletal: Negative for arthralgias, myalgias and neck pain.   Skin: Negative for rash and wound.   Neurological: Negative for dizziness, syncope, speech difficulty, weakness, light-headedness and headaches.   Hematological: Negative for adenopathy. Does not bruise/bleed easily.   Psychiatric/Behavioral: Negative for confusion, dysphoric mood and sleep disturbance. The patient is not nervous/anxious.    All other systems reviewed and are negative.    /62 (BP Location: Left arm, Patient Position: Sitting, Cuff Size: Adult)   Pulse 78   Ht 167.6 cm (66\")   Wt 75.3 kg (166 lb)   SpO2 96%   BMI 26.79 kg/m²     Objective   No Known Allergies    Physical Exam  Vitals reviewed.   Constitutional:       Appearance: Normal appearance. He is well-developed.   HENT:      Head: Normocephalic.   Eyes:      General: Lids are everted, no foreign bodies appreciated.      Conjunctiva/sclera: Conjunctivae normal.   Neck:      Thyroid: No thyromegaly.      Vascular: No carotid bruit or JVD.   Cardiovascular:      Rate and Rhythm: Normal rate and regular rhythm.      Comments: Life vest on  Pulmonary:      Effort: Pulmonary effort is normal.      Breath sounds: Normal breath sounds.   Musculoskeletal:      Cervical back: Neck supple.      Right lower leg: No edema.      Left lower leg: No edema.   Skin:     General: Skin is warm and dry.   Neurological:      Mental Status: He is alert and oriented to person, place, and time.   Psychiatric:         Attention and Perception: Attention normal.         Mood and Affect: Mood normal.         Speech: Speech normal. "         Behavior: Behavior normal. Behavior is cooperative.         Cognition and Memory: Cognition normal.           LABS  WBC   Date Value Ref Range Status   05/14/2017 7.56 4.50 - 12.50 10*3/mm3 Final     RBC   Date Value Ref Range Status   05/14/2017 5.12 4.70 - 6.10 10*6/mm3 Final     Hemoglobin   Date Value Ref Range Status   05/14/2017 15.7 14.0 - 18.0 g/dL Final     Hematocrit   Date Value Ref Range Status   05/14/2017 45.0 42.0 - 52.0 % Final     MCV   Date Value Ref Range Status   05/14/2017 87.9 80.0 - 94.0 fL Final     MCH   Date Value Ref Range Status   05/14/2017 30.7 27.0 - 33.0 pg Final     MCHC   Date Value Ref Range Status   05/14/2017 34.9 33.0 - 37.0 g/dL Final     RDW   Date Value Ref Range Status   05/14/2017 13.6 11.5 - 14.5 % Final     RDW-SD   Date Value Ref Range Status   05/14/2017 43.0 37.0 - 54.0 fl Final     MPV   Date Value Ref Range Status   05/14/2017 7.9 6.0 - 10.0 fL Final     Platelets   Date Value Ref Range Status   05/14/2017 300 130 - 400 10*3/mm3 Final     Neutrophil %   Date Value Ref Range Status   05/14/2017 52.1 30.0 - 70.0 % Final     Lymphocyte %   Date Value Ref Range Status   05/14/2017 35.6 21.0 - 51.0 % Final     Monocyte %   Date Value Ref Range Status   05/14/2017 8.3 0.0 - 10.0 % Final     Eosinophil %   Date Value Ref Range Status   05/14/2017 3.0 0.0 - 5.0 % Final     Basophil %   Date Value Ref Range Status   05/14/2017 0.9 0.0 - 2.0 % Final     Immature Grans %   Date Value Ref Range Status   05/14/2017 0.1 0.0 - 0.5 % Final     Neutrophils, Absolute   Date Value Ref Range Status   05/14/2017 3.93 1.40 - 6.50 10*3/mm3 Final     Lymphocytes, Absolute   Date Value Ref Range Status   05/14/2017 2.69 1.00 - 3.00 10*3/mm3 Final     Monocytes, Absolute   Date Value Ref Range Status   05/14/2017 0.63 0.10 - 0.90 10*3/mm3 Final     Eosinophils, Absolute   Date Value Ref Range Status   05/14/2017 0.23 0.00 - 0.70 10*3/mm3 Final     Basophils, Absolute   Date Value Ref  Range Status   05/14/2017 0.07 0.00 - 0.30 10*3/mm3 Final     Immature Grans, Absolute   Date Value Ref Range Status   05/14/2017 0.01 0.00 - 0.03 10*3/mm3 Final     No results found for: CHOL, TRIG, HDL, LDL    IMAGING  XR Chest 1 View    Result Date: 3/29/2023  No acute cardiopulmonary process. Continued follow-up recommended. Images reviewed, interpreted, and dictated by Dr. SANKET Car. Transcribed by IVONE Hess (R).    CT chest without IV contrast    Result Date: 4/1/2023  Small bilateral pleural effusions with lung base atelectasis. Coronary artery disease and cardiomegaly. Images personally reviewed, interpreted and dictated by BIBIANA Cavanaugh M.D.    Ultrasound Kidneys Bilateral    Result Date: 3/30/2023  No evidence of obstructive uropathy. Small amount of perihepatic and perinephric free fluid/small volume ascites. Questionable hepatic parenchymal disease/cirrhosis. Correlate clinically. Images personally reviewed, interpreted and dictated by THIAGO Ashraf.          Assessment & Plan   Diagnoses and all orders for this visit:    1. Ischemic cardiomyopathy (Primary)  Continue on Entresto, stop Labetolol and start Coreg 25 mg BID, advance GDMT as pt tolerates.     2. Heart failure with reduced ejection fraction  Stable, euvolemic, continue current medications, sodium restrictions recommended.     3. Coronary artery disease involving coronary bypass graft of native heart without angina pectoris  Continue asa, statin, plavix, imdur and beta blocker.     4. Primary hypertension  Controlled, continue on current medicaitons    5. Presence of external cardiac defibrillator  Encourage in compliance, discussed risks and benefits. He denies any recent events or discharges.     Other orders  -     sacubitril-valsartan (Entresto) 49-51 MG tablet; Take 1 tablet by mouth 2 (Two) Times a Day.  Dispense: 60 tablet; Refill: 2  -     carvedilol (COREG) 25 MG tablet; Take 1 tablet by mouth 2 (Two)  Times a Day.  Dispense: 60 tablet; Refill: 2        Reviewed labs and chart   Follow up in 1 month, sooner if needed.

## 2023-06-01 ENCOUNTER — TRANSCRIBE ORDERS (OUTPATIENT)
Dept: ADMINISTRATIVE | Facility: HOSPITAL | Age: 63
End: 2023-06-01
Payer: MEDICAID

## 2023-06-01 DIAGNOSIS — N18.32 CHRONIC KIDNEY DISEASE, STAGE 3B: Primary | ICD-10-CM

## 2023-08-02 ENCOUNTER — LAB (OUTPATIENT)
Dept: LAB | Facility: HOSPITAL | Age: 63
End: 2023-08-02
Payer: MEDICAID

## 2023-08-02 ENCOUNTER — TRANSCRIBE ORDERS (OUTPATIENT)
Dept: ADMINISTRATIVE | Facility: HOSPITAL | Age: 63
End: 2023-08-02
Payer: MEDICAID

## 2023-08-02 ENCOUNTER — HOSPITAL ENCOUNTER (OUTPATIENT)
Dept: ULTRASOUND IMAGING | Facility: HOSPITAL | Age: 63
Discharge: HOME | End: 2023-08-02
Payer: MEDICAID

## 2023-08-02 DIAGNOSIS — N18.32 STAGE 3B CHRONIC KIDNEY DISEASE: ICD-10-CM

## 2023-08-02 DIAGNOSIS — N18.32 STAGE 3B CHRONIC KIDNEY DISEASE: Primary | ICD-10-CM

## 2023-08-02 DIAGNOSIS — N18.32 CHRONIC KIDNEY DISEASE, STAGE 3B: ICD-10-CM

## 2023-08-02 LAB
ALBUMIN SERPL-MCNC: 3.4 G/DL (ref 3.5–5.2)
ALBUMIN UR-MCNC: 100.7 MG/DL
ALBUMIN/GLOB SERPL: 1.3 G/DL
ALP SERPL-CCNC: 90 U/L (ref 39–117)
ALT SERPL W P-5'-P-CCNC: 8 U/L (ref 1–41)
ANION GAP SERPL CALCULATED.3IONS-SCNC: 9.6 MMOL/L (ref 5–15)
AST SERPL-CCNC: 8 U/L (ref 1–40)
BACTERIA UR QL AUTO: NORMAL /HPF
BILIRUB SERPL-MCNC: 0.2 MG/DL (ref 0–1.2)
BILIRUB UR QL STRIP: NEGATIVE
BUN SERPL-MCNC: 35 MG/DL (ref 8–23)
BUN/CREAT SERPL: 19.6 (ref 7–25)
CALCIUM SPEC-SCNC: 8.8 MG/DL (ref 8.6–10.5)
CHLORIDE SERPL-SCNC: 104 MMOL/L (ref 98–107)
CLARITY UR: CLEAR
CO2 SERPL-SCNC: 25.4 MMOL/L (ref 22–29)
COLOR UR: YELLOW
CREAT SERPL-MCNC: 1.79 MG/DL (ref 0.76–1.27)
CREAT UR-MCNC: 157.2 MG/DL
CREAT UR-MCNC: 157.2 MG/DL
EGFRCR SERPLBLD CKD-EPI 2021: 42.6 ML/MIN/1.73
GLOBULIN UR ELPH-MCNC: 2.6 GM/DL
GLUCOSE SERPL-MCNC: 103 MG/DL (ref 65–99)
GLUCOSE UR STRIP-MCNC: NEGATIVE MG/DL
HGB UR QL STRIP.AUTO: NEGATIVE
HYALINE CASTS UR QL AUTO: NORMAL /LPF
KETONES UR QL STRIP: NEGATIVE
LEUKOCYTE ESTERASE UR QL STRIP.AUTO: NEGATIVE
MICROALBUMIN/CREAT UR: 640.6 MG/G
NITRITE UR QL STRIP: NEGATIVE
PH UR STRIP.AUTO: 5.5 [PH] (ref 5–8)
POTASSIUM SERPL-SCNC: 4.7 MMOL/L (ref 3.5–5.2)
PROT ?TM UR-MCNC: 144.3 MG/DL
PROT SERPL-MCNC: 6 G/DL (ref 6–8.5)
PROT UR QL STRIP: ABNORMAL
PROT/CREAT UR: 917.9 MG/G CREA (ref 0–200)
RBC # UR STRIP: NORMAL /HPF
REF LAB TEST METHOD: NORMAL
SODIUM SERPL-SCNC: 139 MMOL/L (ref 136–145)
SP GR UR STRIP: 1.02 (ref 1–1.03)
SQUAMOUS #/AREA URNS HPF: NORMAL /HPF
UROBILINOGEN UR QL STRIP: ABNORMAL
WBC # UR STRIP: NORMAL /HPF

## 2023-08-02 PROCEDURE — 80053 COMPREHEN METABOLIC PANEL: CPT

## 2023-08-02 PROCEDURE — 82043 UR ALBUMIN QUANTITATIVE: CPT

## 2023-08-02 PROCEDURE — 36415 COLL VENOUS BLD VENIPUNCTURE: CPT

## 2023-08-02 PROCEDURE — 82570 ASSAY OF URINE CREATININE: CPT

## 2023-08-02 PROCEDURE — 76775 US EXAM ABDO BACK WALL LIM: CPT

## 2023-08-02 PROCEDURE — 81001 URINALYSIS AUTO W/SCOPE: CPT

## 2023-08-02 PROCEDURE — 84156 ASSAY OF PROTEIN URINE: CPT

## 2023-08-02 PROCEDURE — 76775 US EXAM ABDO BACK WALL LIM: CPT | Performed by: RADIOLOGY

## 2023-10-04 ENCOUNTER — OFFICE VISIT (OUTPATIENT)
Dept: CARDIOLOGY | Facility: CLINIC | Age: 63
End: 2023-10-04
Payer: MEDICAID

## 2023-10-04 VITALS
SYSTOLIC BLOOD PRESSURE: 159 MMHG | HEART RATE: 106 BPM | HEIGHT: 66 IN | OXYGEN SATURATION: 98 % | WEIGHT: 155 LBS | DIASTOLIC BLOOD PRESSURE: 101 MMHG | BODY MASS INDEX: 24.91 KG/M2 | RESPIRATION RATE: 18 BRPM

## 2023-10-04 DIAGNOSIS — N18.30 STAGE 3 CHRONIC KIDNEY DISEASE, UNSPECIFIED WHETHER STAGE 3A OR 3B CKD: ICD-10-CM

## 2023-10-04 DIAGNOSIS — I25.5 ISCHEMIC CARDIOMYOPATHY: Primary | ICD-10-CM

## 2023-10-04 DIAGNOSIS — I50.20 HEART FAILURE WITH REDUCED EJECTION FRACTION: ICD-10-CM

## 2023-10-04 DIAGNOSIS — I25.10 CAD, MULTIPLE VESSEL: ICD-10-CM

## 2023-10-04 DIAGNOSIS — I10 PRIMARY HYPERTENSION: ICD-10-CM

## 2023-10-04 DIAGNOSIS — Z95.810 PRESENCE OF EXTERNAL CARDIAC DEFIBRILLATOR: ICD-10-CM

## 2023-10-04 DIAGNOSIS — R00.0 SINUS TACHYCARDIA: ICD-10-CM

## 2023-10-04 RX ORDER — CARVEDILOL 25 MG/1
25 TABLET ORAL 2 TIMES DAILY
Qty: 60 TABLET | Refills: 2
Start: 2023-10-04

## 2023-10-04 RX ORDER — SACUBITRIL AND VALSARTAN 49; 51 MG/1; MG/1
0.5 TABLET, FILM COATED ORAL 2 TIMES DAILY
Qty: 60 TABLET | Refills: 2
Start: 2023-10-04

## 2023-10-04 NOTE — LETTER
October 5, 2023     OLIVIA Christopher  [address]  [address]  [facility] [address]    Patient: Florencio Cintron   YOB: 1960   Date of Visit: 10/4/2023       Dear OLIVIA Christopher    Florencio Cintron was in my office today. Below is a copy of my note.    If you have questions, please do not hesitate to call me. I look forward to following Florencio along with you.         Sincerely,        OLIVIA Barone        CC: No Recipients    Subjective    Florencio Cintron is a 63 y.o. male.   Chief Complaint   Patient presents with   • Syncope     Had low blood pressure       History of Present Illness   Florencio Cintron is a 63-year-old male who presents to clinic today for cardiology follow-up.    Ischemic cardiomyopathy and heart failure with reduced ejection fraction currently on Entresto and Coreg. He reports BP has been soft recently and he has self decreased medications to once daily. Kidney function has been stable ( he is following with nephrology). He reports breathing has been stable, denies worsening dyspnea, orthopnea or abdominal or LE swelling. He intermittently uses the LifeVest.      Multivessel CAD awaiting cardiothoracic surgeon evaluation (has missed several appointments).  He remains on aspirin, Imdur, Plavix and Lipitor.  He reports compliance with medications and denies medicine side effects or bleeding. He denies chest pain.      COPD following with pulmonology.  Former smoker.    Patient Active Problem List   Diagnosis   • Ischemic cardiomyopathy   • Heart failure with reduced ejection fraction   • Coronary artery disease involving coronary bypass graft of native heart without angina pectoris   • Presence of external cardiac defibrillator   • Primary hypertension   • Chronic obstructive pulmonary disease     Past Medical History:   Diagnosis Date   • Chronic kidney disease    • COPD (chronic obstructive pulmonary disease)    • Hypertension    • Myocardial infarction    • Stroke      Past  Surgical History:   Procedure Laterality Date   • APPENDECTOMY     • TONSILLECTOMY       Family History   Problem Relation Age of Onset   • Heart disease Mother      Social History     Tobacco Use   • Smoking status: Former     Packs/day: 2.00     Types: Cigarettes     Quit date: 4/1/2020     Years since quitting: 3.5     Passive exposure: Past   • Smokeless tobacco: Never   Vaping Use   • Vaping Use: Never used   Substance Use Topics   • Alcohol use: Yes     Comment: qnce a week   • Drug use: Not Currently     The following portions of the patient's history were reviewed and updated as appropriate: allergies, current medications, past family history, past medical history, past social history, past surgical history and problem list.    No Known Allergies      Current Outpatient Medications:   •  Aspirin Low Dose 81 MG EC tablet, Take 1 tablet by mouth Daily., Disp: , Rfl:   •  atorvastatin (LIPITOR) 40 MG tablet, Take 2 tablets by mouth Daily. for cholesterol, Disp: 90 tablet, Rfl: 0  •  Budeson-Glycopyrrol-Formoterol (Breztri Aerosphere) 160-9-4.8 MCG/ACT aerosol inhaler, Inhale 2 puffs 2 (Two) Times a Day., Disp: 10.7 g, Rfl: 5  •  carvedilol (COREG) 25 MG tablet, Take 1 tablet by mouth 2 (Two) Times a Day., Disp: 60 tablet, Rfl: 2  •  clopidogrel (PLAVIX) 75 MG tablet, Take 1 tablet by mouth Daily., Disp: 90 tablet, Rfl: 0  •  Earwax Removal 6.5 % otic solution, INSTILL 4 DROPS IN AFFECTED EAR(S) TWO TIMES A DAY FOR 5 DAYS, Disp: , Rfl:   •  isosorbide mononitrate (IMDUR) 30 MG 24 hr tablet, Take 1 tablet by mouth Daily., Disp: 90 tablet, Rfl: 0  •  sacubitril-valsartan (Entresto) 49-51 MG tablet, Take 0.5 tablets by mouth 2 (Two) Times a Day., Disp: 60 tablet, Rfl: 2  •  tamsulosin (FLOMAX) 0.4 MG capsule 24 hr capsule, TAKE ONE CAPSULE BY MOUTH DAILY FOR PROSTATE, Disp: , Rfl:   •  Ventolin  (90 Base) MCG/ACT inhaler, Inhale 2 puffs Every 4 (Four) Hours As Needed for Wheezing., Disp: 18 g, Rfl:  "5    Review of Systems   Constitutional:  Negative for activity change, appetite change, chills, diaphoresis, fatigue and fever.   HENT:  Negative for congestion, drooling, ear discharge, ear pain, mouth sores, nosebleeds, postnasal drip, rhinorrhea, sinus pressure, sneezing and sore throat.    Eyes:  Negative for pain, discharge and visual disturbance.   Respiratory:  Negative for cough, chest tightness, shortness of breath and wheezing.    Cardiovascular:  Negative for chest pain, palpitations and leg swelling.   Gastrointestinal:  Negative for abdominal pain, constipation, diarrhea, nausea and vomiting.   Endocrine: Negative for cold intolerance, heat intolerance, polydipsia, polyphagia and polyuria.   Musculoskeletal:  Negative for arthralgias, myalgias and neck pain.   Skin:  Negative for rash and wound.   Neurological:  Negative for dizziness, syncope, speech difficulty, weakness, light-headedness and headaches.   Hematological:  Negative for adenopathy. Does not bruise/bleed easily.   Psychiatric/Behavioral:  Negative for confusion, dysphoric mood and sleep disturbance. The patient is not nervous/anxious.    All other systems reviewed and are negative.    BP (!) 159/101 (BP Location: Right arm, Patient Position: Sitting, Cuff Size: Adult)   Pulse 106   Resp 18   Ht 167.6 cm (65.98\")   Wt 70.3 kg (155 lb)   SpO2 98%   BMI 25.03 kg/m²     Objective  No Known Allergies    Physical Exam  Vitals reviewed.   Constitutional:       Appearance: Normal appearance. He is well-developed.   HENT:      Head: Normocephalic.   Eyes:      Conjunctiva/sclera: Conjunctivae normal.   Neck:      Thyroid: No thyromegaly.      Vascular: No carotid bruit or JVD.   Cardiovascular:      Rate and Rhythm: Normal rate and regular rhythm.   Pulmonary:      Effort: Pulmonary effort is normal.      Breath sounds: Normal breath sounds.   Musculoskeletal:      Cervical back: Neck supple.      Right lower leg: No edema.      Left lower " leg: No edema.   Skin:     General: Skin is warm and dry.   Neurological:      Mental Status: He is alert and oriented to person, place, and time.   Psychiatric:         Attention and Perception: Attention normal.         Mood and Affect: Mood normal.         Speech: Speech normal.         Behavior: Behavior normal. Behavior is cooperative.         Cognition and Memory: Cognition normal.         ECG 12 Lead    Date/Time: 10/4/2023 3:59 PM  Performed by: Elinor Walls APRN  Authorized by: Elinor Walls APRN   Comparison: compared with previous ECG   Similar to previous ECG  Rhythm: sinus tachycardia  Rate: tachycardic  BPM: 107  Other findings: non-specific ST-T wave changes and left ventricular hypertrophy    Clinical impression: abnormal EKG  Comments: QT/QTc - 346/408        LABS  WBC   Date Value Ref Range Status   05/14/2017 7.56 4.50 - 12.50 10*3/mm3 Final     RBC   Date Value Ref Range Status   05/14/2017 5.12 4.70 - 6.10 10*6/mm3 Final     Hemoglobin   Date Value Ref Range Status   05/14/2017 15.7 14.0 - 18.0 g/dL Final     Hematocrit   Date Value Ref Range Status   05/14/2017 45.0 42.0 - 52.0 % Final     MCV   Date Value Ref Range Status   05/14/2017 87.9 80.0 - 94.0 fL Final     MCH   Date Value Ref Range Status   05/14/2017 30.7 27.0 - 33.0 pg Final     MCHC   Date Value Ref Range Status   05/14/2017 34.9 33.0 - 37.0 g/dL Final     RDW   Date Value Ref Range Status   05/14/2017 13.6 11.5 - 14.5 % Final     RDW-SD   Date Value Ref Range Status   05/14/2017 43.0 37.0 - 54.0 fl Final     MPV   Date Value Ref Range Status   05/14/2017 7.9 6.0 - 10.0 fL Final     Platelets   Date Value Ref Range Status   05/14/2017 300 130 - 400 10*3/mm3 Final     Neutrophil %   Date Value Ref Range Status   05/14/2017 52.1 30.0 - 70.0 % Final     Lymphocyte %   Date Value Ref Range Status   05/14/2017 35.6 21.0 - 51.0 % Final     Monocyte %   Date Value Ref Range Status   05/14/2017 8.3 0.0 - 10.0 % Final      Eosinophil %   Date Value Ref Range Status   05/14/2017 3.0 0.0 - 5.0 % Final     Basophil %   Date Value Ref Range Status   05/14/2017 0.9 0.0 - 2.0 % Final     Immature Grans %   Date Value Ref Range Status   05/14/2017 0.1 0.0 - 0.5 % Final     Neutrophils, Absolute   Date Value Ref Range Status   05/14/2017 3.93 1.40 - 6.50 10*3/mm3 Final     Lymphocytes, Absolute   Date Value Ref Range Status   05/14/2017 2.69 1.00 - 3.00 10*3/mm3 Final     Monocytes, Absolute   Date Value Ref Range Status   05/14/2017 0.63 0.10 - 0.90 10*3/mm3 Final     Eosinophils, Absolute   Date Value Ref Range Status   05/14/2017 0.23 0.00 - 0.70 10*3/mm3 Final     Basophils, Absolute   Date Value Ref Range Status   05/14/2017 0.07 0.00 - 0.30 10*3/mm3 Final     Immature Grans, Absolute   Date Value Ref Range Status   05/14/2017 0.01 0.00 - 0.03 10*3/mm3 Final     IMAGING   US Renal Bilateral    Result Date: 8/2/2023    No acute findings in the retroperitoneum.  This report was finalized on 8/2/2023 11:03 AM by Dr. Darin Dodge MD.              Assessment & Plan  Diagnoses and all orders for this visit:    1. Ischemic cardiomyopathy (Primary)  -     BNP; Future  Asymptomatic, continue on Entresto half a pill twice a day with close monitoring of BP and hold parameters given.  Sodium restriction  Continue on Coreg 25 mg tablet twice a day if BP can tolerate    2. Heart failure with reduced ejection fraction  -     BNP; Future  Continue with guideline directed medical therapy and advance as tolerated  Unable to tolerate MRA secondary to CKD.  Consider initiation of SGLT2 inhibitor in the future  Will arrange for echocardiogram to reevaluate LVEF    3. CAD, multiple vessel  -     Comprehensive Metabolic Panel; Future  -     Lipid Panel; Future  -     CK; Future  Asymptomatic, EKG discussed and reviewed, stable, continue aspirin Plavix Imdur Coreg and Lipitor.  Encouraged patient to keep upcoming cardiothoracic surgeon appointment.    4.  Sinus tachycardia  -     ECG 12 Lead  Recommend Coreg twice daily if BP can tolerate  Continue to closely monitor    5. Primary hypertension  Elevated in clinic today, encouraged in compliance with medication    6. Presence of external cardiac defibrillator  Discussed importance of defibrillator    7. Stage 3 chronic kidney disease, unspecified whether stage 3a or 3b CKD  Continue to follow with nephrology    Other orders  -     sacubitril-valsartan (Entresto) 49-51 MG tablet; Take 0.5 tablets by mouth 2 (Two) Times a Day.  Dispense: 60 tablet; Refill: 2  -     carvedilol (COREG) 25 MG tablet; Take 1 tablet by mouth 2 (Two) Times a Day.  Dispense: 60 tablet; Refill: 2        Follow-up in 2 to 3 months, sooner if needed

## 2023-10-05 NOTE — PROGRESS NOTES
Subjective     Florencio Cintron is a 63 y.o. male.   Chief Complaint   Patient presents with    Syncope     Had low blood pressure       History of Present Illness   Florencio Cintron is a 63-year-old male who presents to clinic today for cardiology follow-up.    Ischemic cardiomyopathy and heart failure with reduced ejection fraction currently on Entresto and Coreg. He reports BP has been soft recently and he has self decreased medications to once daily. Kidney function has been stable ( he is following with nephrology). He reports breathing has been stable, denies worsening dyspnea, orthopnea or abdominal or LE swelling. He intermittently uses the LifeVest.      Multivessel CAD awaiting cardiothoracic surgeon evaluation (has missed several appointments).  He remains on aspirin, Imdur, Plavix and Lipitor.  He reports compliance with medications and denies medicine side effects or bleeding. He denies chest pain.      COPD following with pulmonology.  Former smoker.    Patient Active Problem List   Diagnosis    Ischemic cardiomyopathy    Heart failure with reduced ejection fraction    Coronary artery disease involving coronary bypass graft of native heart without angina pectoris    Presence of external cardiac defibrillator    Primary hypertension    Chronic obstructive pulmonary disease     Past Medical History:   Diagnosis Date    Chronic kidney disease     COPD (chronic obstructive pulmonary disease)     Hypertension     Myocardial infarction     Stroke      Past Surgical History:   Procedure Laterality Date    APPENDECTOMY      TONSILLECTOMY       Family History   Problem Relation Age of Onset    Heart disease Mother      Social History     Tobacco Use    Smoking status: Former     Packs/day: 2.00     Types: Cigarettes     Quit date: 4/1/2020     Years since quitting: 3.5     Passive exposure: Past    Smokeless tobacco: Never   Vaping Use    Vaping Use: Never used   Substance Use Topics    Alcohol use: Yes     Comment:  qnce a week    Drug use: Not Currently     The following portions of the patient's history were reviewed and updated as appropriate: allergies, current medications, past family history, past medical history, past social history, past surgical history and problem list.    No Known Allergies      Current Outpatient Medications:     Aspirin Low Dose 81 MG EC tablet, Take 1 tablet by mouth Daily., Disp: , Rfl:     atorvastatin (LIPITOR) 40 MG tablet, Take 2 tablets by mouth Daily. for cholesterol, Disp: 90 tablet, Rfl: 0    Budeson-Glycopyrrol-Formoterol (Breztri Aerosphere) 160-9-4.8 MCG/ACT aerosol inhaler, Inhale 2 puffs 2 (Two) Times a Day., Disp: 10.7 g, Rfl: 5    carvedilol (COREG) 25 MG tablet, Take 1 tablet by mouth 2 (Two) Times a Day., Disp: 60 tablet, Rfl: 2    clopidogrel (PLAVIX) 75 MG tablet, Take 1 tablet by mouth Daily., Disp: 90 tablet, Rfl: 0    Earwax Removal 6.5 % otic solution, INSTILL 4 DROPS IN AFFECTED EAR(S) TWO TIMES A DAY FOR 5 DAYS, Disp: , Rfl:     isosorbide mononitrate (IMDUR) 30 MG 24 hr tablet, Take 1 tablet by mouth Daily., Disp: 90 tablet, Rfl: 0    sacubitril-valsartan (Entresto) 49-51 MG tablet, Take 0.5 tablets by mouth 2 (Two) Times a Day., Disp: 60 tablet, Rfl: 2    tamsulosin (FLOMAX) 0.4 MG capsule 24 hr capsule, TAKE ONE CAPSULE BY MOUTH DAILY FOR PROSTATE, Disp: , Rfl:     Ventolin  (90 Base) MCG/ACT inhaler, Inhale 2 puffs Every 4 (Four) Hours As Needed for Wheezing., Disp: 18 g, Rfl: 5    Review of Systems   Constitutional:  Negative for activity change, appetite change, chills, diaphoresis, fatigue and fever.   HENT:  Negative for congestion, drooling, ear discharge, ear pain, mouth sores, nosebleeds, postnasal drip, rhinorrhea, sinus pressure, sneezing and sore throat.    Eyes:  Negative for pain, discharge and visual disturbance.   Respiratory:  Negative for cough, chest tightness, shortness of breath and wheezing.    Cardiovascular:  Negative for chest pain,  "palpitations and leg swelling.   Gastrointestinal:  Negative for abdominal pain, constipation, diarrhea, nausea and vomiting.   Endocrine: Negative for cold intolerance, heat intolerance, polydipsia, polyphagia and polyuria.   Musculoskeletal:  Negative for arthralgias, myalgias and neck pain.   Skin:  Negative for rash and wound.   Neurological:  Negative for dizziness, syncope, speech difficulty, weakness, light-headedness and headaches.   Hematological:  Negative for adenopathy. Does not bruise/bleed easily.   Psychiatric/Behavioral:  Negative for confusion, dysphoric mood and sleep disturbance. The patient is not nervous/anxious.    All other systems reviewed and are negative.    BP (!) 159/101 (BP Location: Right arm, Patient Position: Sitting, Cuff Size: Adult)   Pulse 106   Resp 18   Ht 167.6 cm (65.98\")   Wt 70.3 kg (155 lb)   SpO2 98%   BMI 25.03 kg/m²     Objective   No Known Allergies    Physical Exam  Vitals reviewed.   Constitutional:       Appearance: Normal appearance. He is well-developed.   HENT:      Head: Normocephalic.   Eyes:      Conjunctiva/sclera: Conjunctivae normal.   Neck:      Thyroid: No thyromegaly.      Vascular: No carotid bruit or JVD.   Cardiovascular:      Rate and Rhythm: Normal rate and regular rhythm.   Pulmonary:      Effort: Pulmonary effort is normal.      Breath sounds: Normal breath sounds.   Musculoskeletal:      Cervical back: Neck supple.      Right lower leg: No edema.      Left lower leg: No edema.   Skin:     General: Skin is warm and dry.   Neurological:      Mental Status: He is alert and oriented to person, place, and time.   Psychiatric:         Attention and Perception: Attention normal.         Mood and Affect: Mood normal.         Speech: Speech normal.         Behavior: Behavior normal. Behavior is cooperative.         Cognition and Memory: Cognition normal.         ECG 12 Lead    Date/Time: 10/4/2023 3:59 PM  Performed by: Elinor Walls, " APRN  Authorized by: Elinor Walls APRN   Comparison: compared with previous ECG   Similar to previous ECG  Rhythm: sinus tachycardia  Rate: tachycardic  BPM: 107  Other findings: non-specific ST-T wave changes and left ventricular hypertrophy    Clinical impression: abnormal EKG  Comments: QT/QTc - 346/408        LABS  WBC   Date Value Ref Range Status   05/14/2017 7.56 4.50 - 12.50 10*3/mm3 Final     RBC   Date Value Ref Range Status   05/14/2017 5.12 4.70 - 6.10 10*6/mm3 Final     Hemoglobin   Date Value Ref Range Status   05/14/2017 15.7 14.0 - 18.0 g/dL Final     Hematocrit   Date Value Ref Range Status   05/14/2017 45.0 42.0 - 52.0 % Final     MCV   Date Value Ref Range Status   05/14/2017 87.9 80.0 - 94.0 fL Final     MCH   Date Value Ref Range Status   05/14/2017 30.7 27.0 - 33.0 pg Final     MCHC   Date Value Ref Range Status   05/14/2017 34.9 33.0 - 37.0 g/dL Final     RDW   Date Value Ref Range Status   05/14/2017 13.6 11.5 - 14.5 % Final     RDW-SD   Date Value Ref Range Status   05/14/2017 43.0 37.0 - 54.0 fl Final     MPV   Date Value Ref Range Status   05/14/2017 7.9 6.0 - 10.0 fL Final     Platelets   Date Value Ref Range Status   05/14/2017 300 130 - 400 10*3/mm3 Final     Neutrophil %   Date Value Ref Range Status   05/14/2017 52.1 30.0 - 70.0 % Final     Lymphocyte %   Date Value Ref Range Status   05/14/2017 35.6 21.0 - 51.0 % Final     Monocyte %   Date Value Ref Range Status   05/14/2017 8.3 0.0 - 10.0 % Final     Eosinophil %   Date Value Ref Range Status   05/14/2017 3.0 0.0 - 5.0 % Final     Basophil %   Date Value Ref Range Status   05/14/2017 0.9 0.0 - 2.0 % Final     Immature Grans %   Date Value Ref Range Status   05/14/2017 0.1 0.0 - 0.5 % Final     Neutrophils, Absolute   Date Value Ref Range Status   05/14/2017 3.93 1.40 - 6.50 10*3/mm3 Final     Lymphocytes, Absolute   Date Value Ref Range Status   05/14/2017 2.69 1.00 - 3.00 10*3/mm3 Final     Monocytes, Absolute   Date Value  Ref Range Status   05/14/2017 0.63 0.10 - 0.90 10*3/mm3 Final     Eosinophils, Absolute   Date Value Ref Range Status   05/14/2017 0.23 0.00 - 0.70 10*3/mm3 Final     Basophils, Absolute   Date Value Ref Range Status   05/14/2017 0.07 0.00 - 0.30 10*3/mm3 Final     Immature Grans, Absolute   Date Value Ref Range Status   05/14/2017 0.01 0.00 - 0.03 10*3/mm3 Final     IMAGING   US Renal Bilateral    Result Date: 8/2/2023    No acute findings in the retroperitoneum.  This report was finalized on 8/2/2023 11:03 AM by Dr. Darin Dodge MD.              Assessment & Plan   Diagnoses and all orders for this visit:    1. Ischemic cardiomyopathy (Primary)  -     BNP; Future  Asymptomatic, continue on Entresto half a pill twice a day with close monitoring of BP and hold parameters given.  Sodium restriction  Continue on Coreg 25 mg tablet twice a day if BP can tolerate    2. Heart failure with reduced ejection fraction  -     BNP; Future  Continue with guideline directed medical therapy and advance as tolerated  Unable to tolerate MRA secondary to CKD.  Consider initiation of SGLT2 inhibitor in the future  Will arrange for echocardiogram to reevaluate LVEF    3. CAD, multiple vessel  -     Comprehensive Metabolic Panel; Future  -     Lipid Panel; Future  -     CK; Future  Asymptomatic, EKG discussed and reviewed, stable, continue aspirin Plavix Imdur Coreg and Lipitor.  Encouraged patient to keep upcoming cardiothoracic surgeon appointment.    4. Sinus tachycardia  -     ECG 12 Lead  Recommend Coreg twice daily if BP can tolerate  Continue to closely monitor    5. Primary hypertension  Elevated in clinic today, encouraged in compliance with medication    6. Presence of external cardiac defibrillator  Discussed importance of defibrillator    7. Stage 3 chronic kidney disease, unspecified whether stage 3a or 3b CKD  Continue to follow with nephrology    Other orders  -     sacubitril-valsartan (Entresto) 49-51 MG tablet; Take  0.5 tablets by mouth 2 (Two) Times a Day.  Dispense: 60 tablet; Refill: 2  -     carvedilol (COREG) 25 MG tablet; Take 1 tablet by mouth 2 (Two) Times a Day.  Dispense: 60 tablet; Refill: 2        Follow-up in 2 to 3 months, sooner if needed

## 2023-10-11 ENCOUNTER — OFFICE VISIT (OUTPATIENT)
Dept: CARDIAC SURGERY | Facility: CLINIC | Age: 63
End: 2023-10-11
Payer: MEDICAID

## 2023-10-11 VITALS
SYSTOLIC BLOOD PRESSURE: 152 MMHG | DIASTOLIC BLOOD PRESSURE: 80 MMHG | TEMPERATURE: 97.1 F | HEART RATE: 92 BPM | OXYGEN SATURATION: 97 % | WEIGHT: 161 LBS | BODY MASS INDEX: 26 KG/M2

## 2023-10-11 DIAGNOSIS — I25.118 CORONARY ARTERY DISEASE OF NATIVE ARTERY OF NATIVE HEART WITH STABLE ANGINA PECTORIS: ICD-10-CM

## 2023-10-11 DIAGNOSIS — I25.5 ISCHEMIC CARDIOMYOPATHY: ICD-10-CM

## 2023-10-11 DIAGNOSIS — I50.20 HEART FAILURE WITH REDUCED EJECTION FRACTION: Primary | ICD-10-CM

## 2023-10-11 PROCEDURE — 3077F SYST BP >= 140 MM HG: CPT | Performed by: THORACIC SURGERY (CARDIOTHORACIC VASCULAR SURGERY)

## 2023-10-11 PROCEDURE — 99204 OFFICE O/P NEW MOD 45 MIN: CPT | Performed by: THORACIC SURGERY (CARDIOTHORACIC VASCULAR SURGERY)

## 2023-10-11 PROCEDURE — 1159F MED LIST DOCD IN RCRD: CPT | Performed by: THORACIC SURGERY (CARDIOTHORACIC VASCULAR SURGERY)

## 2023-10-11 PROCEDURE — 1160F RVW MEDS BY RX/DR IN RCRD: CPT | Performed by: THORACIC SURGERY (CARDIOTHORACIC VASCULAR SURGERY)

## 2023-10-11 PROCEDURE — 3079F DIAST BP 80-89 MM HG: CPT | Performed by: THORACIC SURGERY (CARDIOTHORACIC VASCULAR SURGERY)

## 2023-10-11 NOTE — PROGRESS NOTES
10/11/2023  Patient Information  Florencio Cintron                                                                                          [address] [facility] [address]   1960  'PCP/Referring Physician'  Brisa Billingsley, APRN  [phone]  Elinor Walls, APRN  [phone]  Chief Complaint   Patient presents with    Coronary Artery Disease     Referred by OLIVIA Franco for CAD. Patient has chest pains and shortness of breath.        History of Present Illness: 63-year-old  male with history of hypertension, hyperlipidemia, COPD, previous tobacco abuse, stroke and stage IIIa chronic kidney disease who presents with coronary artery disease.  For several years, the patient notes some mild chest pain with significant exertion.  Mr. Cintron has chronic shortness of breath.  He does have associated fatigue, but denies nausea, vomiting, diaphoresis or lower extremity edema.      Patient Active Problem List   Diagnosis    Ischemic cardiomyopathy    Heart failure with reduced ejection fraction    Coronary artery disease involving coronary bypass graft of native heart without angina pectoris    Presence of external cardiac defibrillator    Primary hypertension    Chronic obstructive pulmonary disease     Past Medical History:   Diagnosis Date    Chronic kidney disease     COPD (chronic obstructive pulmonary disease)     Hypertension     Myocardial infarction     Stroke      Past Surgical History:   Procedure Laterality Date    APPENDECTOMY      HEMORRHOIDECTOMY      TONSILLECTOMY         Current Outpatient Medications:     Aspirin Low Dose 81 MG EC tablet, Take 1 tablet by mouth Daily., Disp: , Rfl:     atorvastatin (LIPITOR) 40 MG tablet, Take 2 tablets by mouth Daily. for cholesterol, Disp: 90 tablet, Rfl: 0    Budeson-Glycopyrrol-Formoterol (Breztri Aerosphere) 160-9-4.8 MCG/ACT aerosol inhaler, Inhale 2 puffs 2 (Two) Times a Day., Disp: 10.7 g, Rfl: 5    carvedilol (COREG) 25 MG tablet, Take 1  tablet by mouth 2 (Two) Times a Day., Disp: 60 tablet, Rfl: 2    clopidogrel (PLAVIX) 75 MG tablet, Take 1 tablet by mouth Daily., Disp: 90 tablet, Rfl: 0    Earwax Removal 6.5 % otic solution, INSTILL 4 DROPS IN AFFECTED EAR(S) TWO TIMES A DAY FOR 5 DAYS, Disp: , Rfl:     isosorbide mononitrate (IMDUR) 30 MG 24 hr tablet, Take 1 tablet by mouth Daily., Disp: 90 tablet, Rfl: 0    sacubitril-valsartan (Entresto) 49-51 MG tablet, Take 0.5 tablets by mouth 2 (Two) Times a Day., Disp: 60 tablet, Rfl: 2    tamsulosin (FLOMAX) 0.4 MG capsule 24 hr capsule, TAKE ONE CAPSULE BY MOUTH DAILY FOR PROSTATE, Disp: , Rfl:     Ventolin  (90 Base) MCG/ACT inhaler, Inhale 2 puffs Every 4 (Four) Hours As Needed for Wheezing., Disp: 18 g, Rfl: 5  No Known Allergies  Social History     Socioeconomic History    Marital status:     Number of children: 3   Tobacco Use    Smoking status: Former     Packs/day: 2.00     Years: 45.00     Additional pack years: 0.00     Total pack years: 90.00     Types: Cigarettes     Quit date: 4/1/2020     Years since quitting: 3.5     Passive exposure: Past    Smokeless tobacco: Never   Vaping Use    Vaping Use: Never used   Substance and Sexual Activity    Alcohol use: Not Currently     Comment: qnce a week    Drug use: Not Currently    Sexual activity: Defer     Family History   Problem Relation Age of Onset    Heart disease Mother     Arthritis Brother      Review of Systems   Constitutional: Negative for chills, decreased appetite, diaphoresis, fever, malaise/fatigue, night sweats, weight gain and weight loss.   HENT:  Negative for hoarse voice.    Eyes:  Negative for blurred vision, double vision and visual disturbance.   Cardiovascular:  Positive for chest pain, dyspnea on exertion, near-syncope (3-4 weeks ago) and syncope. Negative for claudication, irregular heartbeat, leg swelling, orthopnea, palpitations and paroxysmal nocturnal dyspnea.   Respiratory:  Positive for cough and  shortness of breath. Negative for hemoptysis, sputum production and wheezing.    Hematologic/Lymphatic: Negative for adenopathy and bleeding problem. Does not bruise/bleed easily.   Skin:  Negative for color change, nail changes, poor wound healing and rash.   Musculoskeletal:  Negative for back pain, falls and muscle cramps.   Gastrointestinal:  Negative for abdominal pain, dysphagia and heartburn.   Genitourinary:  Negative for flank pain.   Neurological:  Positive for dizziness and light-headedness. Negative for brief paralysis, disturbances in coordination, focal weakness, headaches, loss of balance, numbness, paresthesias, sensory change, vertigo and weakness.   Psychiatric/Behavioral:  Negative for depression and suicidal ideas. The patient is not nervous/anxious.    Allergic/Immunologic: Negative for persistent infections.   Vitals:    10/11/23 1259   BP: 152/80   Pulse: 92   Temp: 97.1 øF (36.2 øC)   SpO2: 97%   Weight: 73 kg (161 lb)      Physical Exam  Vitals reviewed.   Constitutional:       General: He is not in acute distress.     Appearance: He is well-developed. He is not diaphoretic.      Comments:  male   HENT:      Head: Normocephalic and atraumatic.   Eyes:      General: No scleral icterus.     Conjunctiva/sclera: Conjunctivae normal.   Neck:      Vascular: No JVD.      Trachea: No tracheal deviation.   Cardiovascular:      Rate and Rhythm: Normal rate and regular rhythm.      Heart sounds: Normal heart sounds. No murmur heard.     No friction rub. No gallop.   Pulmonary:      Effort: Pulmonary effort is normal. No respiratory distress.      Breath sounds: Normal breath sounds. No wheezing or rales.   Abdominal:      General: There is no distension.      Palpations: Abdomen is soft. There is no mass.      Tenderness: There is no abdominal tenderness. There is no guarding or rebound.   Musculoskeletal:         General: Normal range of motion.      Cervical back: Neck supple.   Skin:      General: Skin is warm and dry.      Findings: No erythema or rash.   Neurological:      Mental Status: He is alert and oriented to person, place, and time.   Psychiatric:         Behavior: Behavior normal.         Thought Content: Thought content normal.         Judgment: Judgment normal.         The ROS, past medical history, surgical history, family history, social history, and vitals were reviewed by myself and corrected as needed.      Labs/Imaging:  -Cardiac catheterization performed 3/31/2023, personally reviewed, demonstrates 90% RCA stenosis, 70% proximal LAD stenosis, 80% ostial circumflex stenosis and 80% proximal OM1 stenosis.  -Transthoracic echocardiogram performed 3/30/2023, per report (images unavailable), demonstrates EF 20 to 25%, moderate concentric left ventricular hypertrophy, grade 1 diastolic dysfunction, severe right ventricular systolic dysfunction, mild mitral regurgitation and mild tricuspid regurgitation.    Assessment/Plan:  63-year-old  male with history of hypertension, hyperlipidemia, COPD, previous tobacco abuse, stroke and stage IIIa chronic kidney disease who presents with coronary artery disease.  The patient has chronic systolic congestive heart failure with a decreased ejection fraction in the setting of multivessel coronary artery disease.  His ejection fraction on cardiac catheterization appears severely depressed and wall motion activity is extremely limited.  I will arrange for referral to Dr. Del Rio at [facility] for possible coronary artery bypass grafting.  The patient is at risk for postoperative cardiogenic shock and needs to have potential cardiac support available after surgery including either ECMO or a ventricular assist device.  The patient will be best served at the [facility] for his heart failure and ischemic cardiomyopathy.  He may follow-up in my clinic as needed.    Patient Active Problem List   Diagnosis    Ischemic cardiomyopathy    Heart  failure with reduced ejection fraction    Coronary artery disease involving coronary bypass graft of native heart without angina pectoris    Presence of external cardiac defibrillator    Primary hypertension    Chronic obstructive pulmonary disease

## 2023-11-14 ENCOUNTER — TELEPHONE (OUTPATIENT)
Dept: CARDIOLOGY | Facility: CLINIC | Age: 63
End: 2023-11-14
Payer: MEDICAID

## 2023-11-14 RX ORDER — CARVEDILOL 25 MG/1
25 TABLET ORAL 2 TIMES DAILY
Qty: 180 TABLET | Refills: 1 | Status: SHIPPED | OUTPATIENT
Start: 2023-11-14

## 2023-11-14 RX ORDER — SACUBITRIL AND VALSARTAN 49; 51 MG/1; MG/1
0.5 TABLET, FILM COATED ORAL 2 TIMES DAILY
Qty: 180 TABLET | Refills: 1 | Status: SHIPPED | OUTPATIENT
Start: 2023-11-14

## 2023-11-16 ENCOUNTER — TELEPHONE (OUTPATIENT)
Dept: CARDIOLOGY | Facility: CLINIC | Age: 63
End: 2023-11-16
Payer: MEDICAID

## 2023-11-16 ENCOUNTER — HOSPITAL ENCOUNTER (OUTPATIENT)
Dept: CARDIOLOGY | Facility: HOSPITAL | Age: 63
Discharge: HOME | End: 2023-11-16
Payer: MEDICAID

## 2023-11-16 ENCOUNTER — LAB (OUTPATIENT)
Dept: LAB | Facility: HOSPITAL | Age: 63
End: 2023-11-16
Payer: MEDICAID

## 2023-11-16 DIAGNOSIS — I25.5 ISCHEMIC CARDIOMYOPATHY: ICD-10-CM

## 2023-11-16 DIAGNOSIS — I50.20 HEART FAILURE WITH REDUCED EJECTION FRACTION: ICD-10-CM

## 2023-11-16 DIAGNOSIS — I25.10 CAD, MULTIPLE VESSEL: ICD-10-CM

## 2023-11-16 LAB
ALBUMIN SERPL-MCNC: 3.4 G/DL (ref 3.5–5.2)
ALBUMIN/GLOB SERPL: 1.3 G/DL
ALP SERPL-CCNC: 97 U/L (ref 39–117)
ALT SERPL W P-5'-P-CCNC: 10 U/L (ref 1–41)
ANION GAP SERPL CALCULATED.3IONS-SCNC: 10.1 MMOL/L (ref 5–15)
AST SERPL-CCNC: 10 U/L (ref 1–40)
BH CV ECHO MEAS - AO MAX PG: 9.1 MMHG
BH CV ECHO MEAS - AO MEAN PG: 4 MMHG
BH CV ECHO MEAS - AO ROOT DIAM: 3.5 CM
BH CV ECHO MEAS - AO V2 MAX: 151 CM/SEC
BH CV ECHO MEAS - AO V2 VTI: 29.6 CM
BH CV ECHO MEAS - EDV(CUBED): 110.6 ML
BH CV ECHO MEAS - EDV(MOD-SP4): 56.4 ML
BH CV ECHO MEAS - EF(MOD-SP4): 30.9 %
BH CV ECHO MEAS - ESV(CUBED): 50.7 ML
BH CV ECHO MEAS - ESV(MOD-SP4): 39 ML
BH CV ECHO MEAS - FS: 22.9 %
BH CV ECHO MEAS - IVS/LVPW: 1 CM
BH CV ECHO MEAS - IVSD: 1.1 CM
BH CV ECHO MEAS - LA DIMENSION: 3.9 CM
BH CV ECHO MEAS - LAT PEAK E' VEL: 12 CM/SEC
BH CV ECHO MEAS - LV DIASTOLIC VOL/BSA (35-75): 31.3 CM2
BH CV ECHO MEAS - LV MASS(C)D: 194 GRAMS
BH CV ECHO MEAS - LV SYSTOLIC VOL/BSA (12-30): 21.6 CM2
BH CV ECHO MEAS - LVIDD: 4.8 CM
BH CV ECHO MEAS - LVIDS: 3.7 CM
BH CV ECHO MEAS - LVOT AREA: 3.8 CM2
BH CV ECHO MEAS - LVOT DIAM: 2.2 CM
BH CV ECHO MEAS - LVPWD: 1.1 CM
BH CV ECHO MEAS - MED PEAK E' VEL: 7.7 CM/SEC
BH CV ECHO MEAS - MV A MAX VEL: 81 CM/SEC
BH CV ECHO MEAS - MV E MAX VEL: 71.7 CM/SEC
BH CV ECHO MEAS - MV E/A: 0.89
BH CV ECHO MEAS - PA ACC TIME: 0.1 SEC
BH CV ECHO MEAS - RAP SYSTOLE: 10 MMHG
BH CV ECHO MEAS - RVSP: 52 MMHG
BH CV ECHO MEAS - SI(MOD-SP4): 9.6 ML/M2
BH CV ECHO MEAS - SV(MOD-SP4): 17.4 ML
BH CV ECHO MEAS - TAPSE (>1.6): 2.24 CM
BH CV ECHO MEAS - TR MAX PG: 42 MMHG
BH CV ECHO MEAS - TR MAX VEL: 324 CM/SEC
BH CV ECHO MEASUREMENTS AVERAGE E/E' RATIO: 7.28
BILIRUB SERPL-MCNC: 0.3 MG/DL (ref 0–1.2)
BUN SERPL-MCNC: 25 MG/DL (ref 8–23)
BUN/CREAT SERPL: 21.4 (ref 7–25)
CALCIUM SPEC-SCNC: 9.4 MG/DL (ref 8.6–10.5)
CHLORIDE SERPL-SCNC: 105 MMOL/L (ref 98–107)
CHOLEST SERPL-MCNC: 144 MG/DL (ref 0–200)
CK SERPL-CCNC: 53 U/L (ref 20–200)
CO2 SERPL-SCNC: 23.9 MMOL/L (ref 22–29)
CREAT SERPL-MCNC: 1.17 MG/DL (ref 0.76–1.27)
EGFRCR SERPLBLD CKD-EPI 2021: 70.5 ML/MIN/1.73
GLOBULIN UR ELPH-MCNC: 2.7 GM/DL
GLUCOSE SERPL-MCNC: 135 MG/DL (ref 65–99)
HDLC SERPL-MCNC: 47 MG/DL (ref 40–60)
LDLC SERPL CALC-MCNC: 86 MG/DL (ref 0–100)
LDLC/HDLC SERPL: 1.84 {RATIO}
LEFT ATRIUM VOLUME INDEX: 27.7 ML/M2
NT-PROBNP SERPL-MCNC: 1213 PG/ML (ref 0–900)
POTASSIUM SERPL-SCNC: 4 MMOL/L (ref 3.5–5.2)
PROT SERPL-MCNC: 6.1 G/DL (ref 6–8.5)
SODIUM SERPL-SCNC: 139 MMOL/L (ref 136–145)
TRIGL SERPL-MCNC: 52 MG/DL (ref 0–150)
VLDLC SERPL-MCNC: 11 MG/DL (ref 5–40)

## 2023-11-16 PROCEDURE — 83880 ASSAY OF NATRIURETIC PEPTIDE: CPT

## 2023-11-16 PROCEDURE — 80061 LIPID PANEL: CPT

## 2023-11-16 PROCEDURE — 80053 COMPREHEN METABOLIC PANEL: CPT

## 2023-11-16 PROCEDURE — 93306 TTE W/DOPPLER COMPLETE: CPT

## 2023-11-16 PROCEDURE — 82550 ASSAY OF CK (CPK): CPT

## 2023-11-16 PROCEDURE — 36415 COLL VENOUS BLD VENIPUNCTURE: CPT

## 2023-11-16 RX ORDER — ISOSORBIDE MONONITRATE 30 MG/1
30 TABLET, EXTENDED RELEASE ORAL DAILY
Qty: 90 TABLET | Refills: 0 | Status: SHIPPED | OUTPATIENT
Start: 2023-11-16

## 2023-11-16 RX ORDER — CLOPIDOGREL BISULFATE 75 MG/1
75 TABLET ORAL DAILY
Qty: 90 TABLET | Refills: 0 | Status: SHIPPED | OUTPATIENT
Start: 2023-11-16

## 2023-11-16 NOTE — TELEPHONE ENCOUNTER
Patients wife called said that [facility] told them they did not receive the RX's for the imdur or the plavix that they requested wants to know if we can send them in for them.

## 2023-11-21 ENCOUNTER — TELEPHONE (OUTPATIENT)
Dept: CARDIOLOGY | Facility: CLINIC | Age: 63
End: 2023-11-21
Payer: MEDICAID

## 2023-11-21 NOTE — TELEPHONE ENCOUNTER
Called pt to inform him of the following per Elinor Walls APRN   Echocardiogram with improved LVEF - he can discontinue life vest use  Labs - kidney function is stable and much improved  BNP - slightly elevated - ensure he is taking medications and not having dyspnea or worsening swelling  CK - normal  Cholesterol - ok - will discuss further at follow up appt    Keep follow up appt as scheduled.      Can not reach pt.

## 2023-11-21 NOTE — TELEPHONE ENCOUNTER
----- Message from OLIVIA Merino sent at 11/21/2023 12:45 PM EST -----  Echocardiogram with improved LVEF - he can discontinue life vest use   Labs - kidney function is stable and much improved   BNP - slightly elevated - ensure he is taking medications and not having dyspnea or worsening swelling   CK - normal   Cholesterol - ok - will discuss further at follow up appt     Keep follow up appt as scheduled.

## 2024-02-06 ENCOUNTER — OFFICE VISIT (OUTPATIENT)
Dept: CARDIOLOGY | Facility: CLINIC | Age: 64
End: 2024-02-06
Payer: MEDICAID

## 2024-02-06 VITALS
OXYGEN SATURATION: 98 % | BODY MASS INDEX: 26.52 KG/M2 | HEIGHT: 66 IN | DIASTOLIC BLOOD PRESSURE: 80 MMHG | WEIGHT: 165 LBS | SYSTOLIC BLOOD PRESSURE: 140 MMHG | HEART RATE: 68 BPM

## 2024-02-06 DIAGNOSIS — I10 PRIMARY HYPERTENSION: ICD-10-CM

## 2024-02-06 DIAGNOSIS — N18.30 STAGE 3 CHRONIC KIDNEY DISEASE, UNSPECIFIED WHETHER STAGE 3A OR 3B CKD: ICD-10-CM

## 2024-02-06 DIAGNOSIS — I50.32 CHRONIC HEART FAILURE WITH PRESERVED EJECTION FRACTION: ICD-10-CM

## 2024-02-06 DIAGNOSIS — I25.10 CAD, MULTIPLE VESSEL: ICD-10-CM

## 2024-02-06 DIAGNOSIS — I25.5 ISCHEMIC CARDIOMYOPATHY: Primary | ICD-10-CM

## 2024-02-06 PROCEDURE — 3079F DIAST BP 80-89 MM HG: CPT | Performed by: NURSE PRACTITIONER

## 2024-02-06 PROCEDURE — 93000 ELECTROCARDIOGRAM COMPLETE: CPT | Performed by: NURSE PRACTITIONER

## 2024-02-06 PROCEDURE — 1159F MED LIST DOCD IN RCRD: CPT | Performed by: NURSE PRACTITIONER

## 2024-02-06 PROCEDURE — 99214 OFFICE O/P EST MOD 30 MIN: CPT | Performed by: NURSE PRACTITIONER

## 2024-02-06 PROCEDURE — 3077F SYST BP >= 140 MM HG: CPT | Performed by: NURSE PRACTITIONER

## 2024-02-06 PROCEDURE — 1160F RVW MEDS BY RX/DR IN RCRD: CPT | Performed by: NURSE PRACTITIONER

## 2024-02-06 RX ORDER — FINERENONE 10 MG/1
1 TABLET, FILM COATED ORAL DAILY
COMMUNITY

## 2024-02-06 NOTE — LETTER
February 6, 2024     OLIVIA Christopher  [address]  [address]  [facility] [address]    Patient: Florencio Cintron   YOB: 1960   Date of Visit: 2/6/2024       Dear OLIVIA Christopher    Florencio Cintron was in my office today. Below is a copy of my note.    If you have questions, please do not hesitate to call me. I look forward to following Florencio along with you.         Sincerely,        OLIVIA Barone        CC: No Recipients    Subjective    Florencio Cintron is a 63 y.o. male.   Chief Complaint   Patient presents with   • Hypertension     Today        History of Present Illness   Florencio Cintron is a 63-year-old male who presents to the clinic today for cardiology follow-up.  He is overall feeling well and denies any acute complaint    Ischemic cardiomyopathy and heart failure with reduced ejection fraction currently on Entresto and Coreg. He reports BP has been soft recently and Entresto and Coreg dosing were decreased. Kidney function has been stable (he is following with nephrology). He reports breathing has been stable, denies worsening dyspnea, orthopnea or abdominal or LE swelling.  Recent echo with recovered LVEF of 56 to 60%.      Multivessel CAD.  He remains on aspirin and Plavix and denies bleeding issues.  He was initially referred to cardiothoracic surgeon Dr. Odonnell who recommended patient see Dr. Del Rio at  due to high risk.  Dr. Del Rio felt he was high risk as well and referred to interventional cardiology for considerations of repeat cardiac cath and possible PCI.  Successful intravascular lithotripsy assisted PCI to severe calcific 90% mid RCA stenosis with placement of a 3.0 x 30 mm Frank frontier drug-eluting stent, postdilated in the proximal to mid segments to 3.5 mm, severe ostial left circumflex disease not intervened upon and medical management was recommended.  He reports doing well status post cath and feels his symptoms are improved.  He denies chest pain and feels his  breathing is stable.  He reports compliance with medications.  He remains on aspirin, Imdur, Plavix, Coreg and Lipitor.      COPD following with pulmonology. Moderate pulmonary hypertension noted on recent echo likely related to COPD.  Discussed the importance of following with pulmonology and adherence to medical therapy.  Former smoker.  Patient Active Problem List   Diagnosis   • Ischemic cardiomyopathy   • Heart failure with reduced ejection fraction   • Coronary artery disease of native artery of native heart with stable angina pectoris   • Presence of external cardiac defibrillator   • Primary hypertension   • Chronic obstructive pulmonary disease     Past Medical History:   Diagnosis Date   • Chronic kidney disease    • COPD (chronic obstructive pulmonary disease)    • Hypertension    • Myocardial infarction    • Stroke      Past Surgical History:   Procedure Laterality Date   • APPENDECTOMY     • HEMORRHOIDECTOMY     • TONSILLECTOMY         Family History   Problem Relation Age of Onset   • Heart disease Mother    • Arthritis Brother      Social History     Tobacco Use   • Smoking status: Former     Packs/day: 2.00     Years: 45.00     Additional pack years: 0.00     Total pack years: 90.00     Types: Cigarettes     Quit date: 4/1/2020     Years since quitting: 3.8     Passive exposure: Past   • Smokeless tobacco: Never   Vaping Use   • Vaping Use: Never used   Substance Use Topics   • Alcohol use: Not Currently     Comment: qnce a week   • Drug use: Not Currently         The following portions of the patient's history were reviewed and updated as appropriate: allergies, current medications, past family history, past medical history, past social history, past surgical history and problem list.    No Known Allergies      Current Outpatient Medications:   •  Aspirin Low Dose 81 MG EC tablet, Take 1 tablet by mouth Daily., Disp: , Rfl:   •  atorvastatin (LIPITOR) 40 MG tablet, Take 2 tablets by mouth Daily. for  cholesterol, Disp: 90 tablet, Rfl: 0  •  Budeson-Glycopyrrol-Formoterol (Breztri Aerosphere) 160-9-4.8 MCG/ACT aerosol inhaler, Inhale 2 puffs 2 (Two) Times a Day., Disp: 10.7 g, Rfl: 5  •  carvedilol (COREG) 25 MG tablet, Take 1 tablet by mouth 2 (Two) Times a Day. (Patient taking differently: Take 0.5 tablets by mouth 2 (Two) Times a Day.), Disp: 180 tablet, Rfl: 1  •  clopidogrel (PLAVIX) 75 MG tablet, Take 1 tablet by mouth Daily., Disp: 90 tablet, Rfl: 0  •  Earwax Removal 6.5 % otic solution, INSTILL 4 DROPS IN AFFECTED EAR(S) TWO TIMES A DAY FOR 5 DAYS, Disp: , Rfl:   •  isosorbide mononitrate (IMDUR) 30 MG 24 hr tablet, Take 1 tablet by mouth Daily., Disp: 90 tablet, Rfl: 0  •  Kerendia 10 MG tablet, Take 1 tablet by mouth Daily., Disp: , Rfl:   •  sacubitril-valsartan (Entresto) 49-51 MG tablet, Take 0.5 tablets by mouth 2 (Two) Times a Day., Disp: 180 tablet, Rfl: 1  •  tamsulosin (FLOMAX) 0.4 MG capsule 24 hr capsule, TAKE ONE CAPSULE BY MOUTH DAILY FOR PROSTATE, Disp: , Rfl:   •  Ventolin  (90 Base) MCG/ACT inhaler, Inhale 2 puffs Every 4 (Four) Hours As Needed for Wheezing., Disp: 18 g, Rfl: 5    Review of Systems   Constitutional:  Negative for activity change, appetite change, chills, diaphoresis, fatigue and fever.   HENT:  Negative for congestion, drooling, ear discharge, ear pain, mouth sores, nosebleeds, postnasal drip, rhinorrhea, sinus pressure, sneezing and sore throat.    Eyes:  Negative for pain, discharge and visual disturbance.   Respiratory:  Negative for cough, chest tightness, shortness of breath and wheezing.    Cardiovascular:  Negative for chest pain, palpitations and leg swelling.   Gastrointestinal:  Negative for abdominal pain, constipation, diarrhea, nausea and vomiting.   Endocrine: Negative for cold intolerance, heat intolerance, polydipsia, polyphagia and polyuria.   Musculoskeletal:  Negative for arthralgias, myalgias and neck pain.   Skin:  Negative for rash and wound.  "  Neurological:  Negative for dizziness, syncope, speech difficulty, weakness, light-headedness and headaches.   Hematological:  Negative for adenopathy. Does not bruise/bleed easily.   Psychiatric/Behavioral:  Negative for confusion, dysphoric mood and sleep disturbance. The patient is not nervous/anxious.    All other systems reviewed and are negative.    /80 (BP Location: Left arm, Patient Position: Sitting, Cuff Size: Adult)   Pulse 68   Ht 167.6 cm (66\")   Wt 74.8 kg (165 lb)   SpO2 98%   BMI 26.63 kg/m²     BP recheck 128/78     Objective  No Known Allergies    Physical Exam  Vitals reviewed.   Constitutional:       Appearance: Normal appearance. He is well-developed.   HENT:      Head: Normocephalic.   Eyes:      Conjunctiva/sclera: Conjunctivae normal.   Neck:      Thyroid: No thyromegaly.      Vascular: No carotid bruit or JVD.   Cardiovascular:      Rate and Rhythm: Normal rate and regular rhythm.   Pulmonary:      Effort: Pulmonary effort is normal.      Breath sounds: Normal breath sounds.   Musculoskeletal:      Cervical back: Neck supple.      Right lower leg: No edema.      Left lower leg: No edema.   Skin:     General: Skin is warm and dry.   Neurological:      Mental Status: He is alert and oriented to person, place, and time.   Psychiatric:         Attention and Perception: Attention normal.         Mood and Affect: Mood normal.         Speech: Speech normal.         Behavior: Behavior normal. Behavior is cooperative.         Cognition and Memory: Cognition normal.           ECG 12 Lead    Date/Time: 2/6/2024 6:00 PM  Performed by: Elinor Walls APRN    Authorized by: Elinor Walls APRN  Comparison: compared with previous ECG   Similar to previous ECG  Comparison to previous ECG: ST changes are noted in V5 and V6 but improved as compared to previous EKG   Rhythm: sinus rhythm  Rate: normal  BPM: 69  Other findings: non-specific ST-T wave changes and T wave " abnormality    Clinical impression: myocardial ischemia  Clinical impression comment: consider  Comments: QT/QTc - 432/451          LABS  WBC   Date Value Ref Range Status   01/11/2024 6.97 3.70 - 10.30 10*3/uL Final     RBC   Date Value Ref Range Status   01/11/2024 4.14 (L) 4.60 - 6.10 10*6/uL Final     Hemoglobin   Date Value Ref Range Status   01/11/2024 11.8 (L) 13.7 - 17.5 g/dL Final     Hematocrit   Date Value Ref Range Status   01/11/2024 36.6 (L) 40.0 - 51.0 % Final     MCV   Date Value Ref Range Status   01/11/2024 88 79 - 98 fL Final     MCH   Date Value Ref Range Status   01/11/2024 28.5 26.0 - 32.0 pg Final     MCHC   Date Value Ref Range Status   01/11/2024 32.2 30.7 - 35.5 g/dL Final     RDW   Date Value Ref Range Status   01/11/2024 13.4 11.5 - 14.5 % Final     RDW-SD   Date Value Ref Range Status   05/14/2017 43.0 37.0 - 54.0 fl Final     MPV   Date Value Ref Range Status   01/11/2024 8.4 (L) 8.8 - 12.5 fL Final     Platelets   Date Value Ref Range Status   01/11/2024 214 155 - 369 10*3/uL Final     Neutrophil %   Date Value Ref Range Status   05/14/2017 52.1 30.0 - 70.0 % Final     Lymphocyte %   Date Value Ref Range Status   05/14/2017 35.6 21.0 - 51.0 % Final     Monocyte %   Date Value Ref Range Status   05/14/2017 8.3 0.0 - 10.0 % Final     Eosinophil %   Date Value Ref Range Status   05/14/2017 3.0 0.0 - 5.0 % Final     Basophil %   Date Value Ref Range Status   05/14/2017 0.9 0.0 - 2.0 % Final     Immature Grans %   Date Value Ref Range Status   05/14/2017 0.1 0.0 - 0.5 % Final     Neutrophils, Absolute   Date Value Ref Range Status   05/14/2017 3.93 1.40 - 6.50 10*3/mm3 Final     Lymphocytes, Absolute   Date Value Ref Range Status   05/14/2017 2.69 1.00 - 3.00 10*3/mm3 Final     Monocytes, Absolute   Date Value Ref Range Status   05/14/2017 0.63 0.10 - 0.90 10*3/mm3 Final     Eosinophils, Absolute   Date Value Ref Range Status   05/14/2017 0.23 0.00 - 0.70 10*3/mm3 Final     Basophils,  Absolute   Date Value Ref Range Status   05/14/2017 0.07 0.00 - 0.30 10*3/mm3 Final     Immature Grans, Absolute   Date Value Ref Range Status   05/14/2017 0.01 0.00 - 0.03 10*3/mm3 Final     nRBC   Date Value Ref Range Status   01/11/2024 0.0 <=0.0 per 100 WBCs Final       Total Cholesterol   Date Value Ref Range Status   11/16/2023 144 0 - 200 mg/dL Final     Triglycerides   Date Value Ref Range Status   11/16/2023 52 0 - 150 mg/dL Final     HDL Cholesterol   Date Value Ref Range Status   11/16/2023 47 40 - 60 mg/dL Final     LDL Cholesterol    Date Value Ref Range Status   11/16/2023 86 0 - 100 mg/dL Final     IMAGING  Adult Transthoracic Echo Complete W/ Cont if Necessary Per Protocol    Addendum Date: 11/16/2023    •  Normal left ventricular cavity size with moderate concentric hypertrophy noted •  Left ventricular systolic function is normal.  LV ejection fraction is around 56- 60% •  Left ventricular diastolic function is consistent with (grade I) impaired relaxation. •  Physiological tricuspid regurgitation with moderate pulmonary hypertension (peak RV systolic pressure is 45 to 55 mmHg). •  No significant valvular heart disease noted. •  No pericardial effusion detected.     Addendum Date: 11/16/2023    •  Normal left ventricular cavity size with moderate concentric hypertrophy noted •  Left ventricular systolic function is normal.  LV ejection fraction is around 60% •  Left ventricular diastolic function is consistent with (grade I) impaired relaxation. •  Physiological tricuspid regurgitation with moderate pulmonary hypertension (peak RV systolic pressure is 45 to 55 mmHg). •  No significant valvular heart disease noted. •  No pericardial effusion detected.       Imaging Results - CORONARY ANGIOGRAPHY (01/11/2024 9:37 AM EST)  Narrative   01/12/2024 11:50 AM EST   This result has an attachment that is not available.   Conclusion:  1. Refractory angina in the setting of severe two vessel CAD, deemed  "not  to be a candidate for surgical revascularization due to multiple  comorbidities.  2. Successful intravascular lithotripsy-assisted PCI to severe calcific  90% mid RCA stenosis with placement of a 3.0 x 30 mm Frank Kings  drug-eluting stent, post-dilated in the proximal to mid segments to 3.5  mm.  3. Severe ostial left circumflex disease, not intervened upon.  4. Successful hemostasis of 6F right radial arteriotomy with TR band.    Recommendations:  1. Routine vascular access and post-catheterization care per protocol.    2. Continue DAPT with aspirin and clopidogrel for at least 6 months,  followed by clopidogrel monotherapy indefinitely thereafter.  3. Comprehensive cardiovascular risk factor reduction including weight  loss, dietary counseling, tobacco cessation, glycemic control, management  of hypertension and dyslipidemia, as applicable.    4. Medical management of residual circumflex disease.  5. Outpatient follow-up with Dr. Amaya in 4 weeks.    Procedure Details  After informed consent was obtained, the patient was brought to the cardiac catheterization laboratory. A time out was done to confirm the correct patient, site and procedure. The patient's right wrist was prepped and draped in sterile fashion. The skin overlying the patient's right radial region was anesthetized with 1% lidocaine. Using modified Seldinger technique, a 21-gauge micropuncture needle was used to puncture the right radial artery and 0.021\" guidewire was advanced into the radial artery without difficulty. A 6F short sheath was advanced over a guidewire and flushed. At that time a cocktail of heparin and nitroglycerin was given through the radial sheath. A 6F JR 4 guide catheter was advanced into the ascending aorta over a 0.035'' J-tipped guidewire and used to engage the right coronary. Reference coronary angiography was obtained using a small amount of contrast, injected by hand. After confirming therapeutic activated clotting " time, a Jesús Blue wire was advanced beyond the lesion and into the PDA. We advanced an Emerge RX 2.0 x 12 mm non-compliant balloon into lesion and inflated to 16 kel. The balloon was withdrawn slightly and serial inflations performed using the same technique. Additional pre-dilation was then performed using 2.5 x 15, 2.75 x 20, and 3.0 x 15 mm NC balloons in similar fashion as above. Given the continued under expansion of the lesion, we decided to proceed with intracoronary shockwave lithotripsy for further plaque modification. We inserted a 3.0 x 12 mm Shockwave balloon through a 6F Guideliner and delivered 8 sets of 10 pulses with each run throughout the stenosis, with balloon inflated to 4-6 kel during delivery of the pulsations. We then performed angiography to ensure no perforation or dissection. A new 3.0 x 15 mm NC balloon was then inserted and additional pre-dilation performed at up to 22 kel. We then advanced a 3.0 x 30 mm Union Trinity drug-eluting stent into the lesion and deployed at 16 kel. Intravascular ultrasound (IVUS) was then performed and demonstrated appropriate stent expansion and apposition with no evidence of edge dissection. The distal stent was then post-dilated with a 3.0 x 15 mm NC balloon, inflated to 16 kel. The proximal to mid stent segments were then post-dilated using a 3.5 x 20 mm NC balloon, inflated to 18 kel. The balloon and wire were removed and final angiography was performed to ensure that there was no perforation, dissection or side branch loss. The guide catheter was removed over a wire. A TR band was placed, and the radial artery introducer was removed. The TR band was inflated with 12 mL of air. The patient was transferred back to the cath lab holding area in good condition.    Coronary Findings Diagnostic Dominance: Right  Right Coronary Artery: Large caliber vessel which originates from the right coronary cusp and is dominant for inferior circulation, giving rise to a PDA  and small posterolateral branch at its distal bifurcation. The vessel is heavily calcified with eccentric, tubular 90% stenosis in the mid segment, followed by sequential 40% stenoses in the distal vessel. The PDA is small caliber and has a 50% stenosis in its mid portion. Mid RCA lesion is 90% stenosed. ASHU flow is 3. The lesion is type C. calcified The lesion is severe.    Intervention  Mid RCA lesion: Angioplasty: Angioplasty using noncompliant was performed prior to stent deployment. The balloon used was a CATHETER BALLOON EMERGE NC MR 2X15MM. Maximum pressure: 16 kel. Inflation time: 10 sec. Angioplasty: Angioplasty using noncompliant was performed prior to stent deployment. The balloon used was a CATHETER BALLOON EMERGE NC MR 2.5X15MM. Maximum pressure: 16 kel. Inflation time: 10 sec. Angioplasty: Angioplasty using noncompliant was performed prior to stent deployment. The balloon used was a CATHETER BALLOON EMERGE NC MR 2.75F39OJ. Maximum pressure: 20 kel. Inflation time: 10 sec. Angioplasty: Angioplasty using noncompliant was performed prior to stent deployment. The balloon used was a CATHETER BALLOON EMERGE MR 3X15MM. Maximum pressure: 22 kel. Inflation time: 10 sec. Angioplasty: Angioplasty using standard was performed prior to stent deployment. The balloon used was a CATHETER IVL SHOCKWAVE C2 3.0MM X 12MM. Maximum pressure: 6 kel. Inflation time: 10 sec. Stent: Drug-eluting stent was successfully placed. The stent used was a STENT CORONARY FRONTIER DELPHINE RX 3.00MM X 30MM. Stent was deployed by way of balloon expansion. Maximum pressure: 16 kel. Inflation time: 12 sec. Angioplasty: Angioplasty using noncompliant was performed following stent deployment. The balloon used was a CATHETER BALLOON EMERGE MR 3X15MM. Maximum pressure: 16 kel. Inflation time: 10 sec. Angioplasty: Angioplasty using noncompliant was performed following stent deployment. The balloon used was a CATHETER BALLOON EMERGE NC MR 3.5X20MM.  Maximum pressure: 18 kel. Inflation time: 10 sec. Post-Intervention Lesion Assessment: The intervention was successful. Post-intervention ASHU flow is 3. There were no complications. Ultrasound (IVUS) was performed. Minimum stent area: 8.2 mm². There is a 0% residual stenosis post intervention.           Assessment & Plan  Diagnoses and all orders for this visit:    1. Ischemic cardiomyopathy (Primary)  -     BNP; Future  Continue on Entresto, Coreg, aspirin  Patient is clinically asymptomatic, will continue to monitor    2. CAD, multiple vessel  -     Comprehensive Metabolic Panel; Future  -     Lipid Panel; Future  -     CK; Future  -     CBC & Differential; Future  -     ECG 12 Lead  Status post stent, doing well  EKG reviewed and discussed, ST changes have improved  Continue aspirin, Plavix, Coreg, Imdur and statin  As recommended will continue on DAPT for 6 months and then Plavix indefinitely    3. Chronic heart failure with preserved ejection fraction  LVEF has improved, will continue to monitor    4. Primary hypertension  Controlled, continue current therapy  Routine monitoring recommended    5. Stage 3 chronic kidney disease, unspecified whether stage 3a or 3b CKD  Management per nephrology          Lifestyle modifications including heart healthy diet, regular exercise, maintenance of desirable body weight and avoidance of tobacco products    Follow-up in 3 months, sooner if needed

## 2024-02-06 NOTE — PROGRESS NOTES
Subjective     Florencio Cintron is a 63 y.o. male.   Chief Complaint   Patient presents with    Hypertension     Today        History of Present Illness   Florencio Cintron is a 63-year-old male who presents to the clinic today for cardiology follow-up.  He is overall feeling well and denies any acute complaint    Ischemic cardiomyopathy and heart failure with reduced ejection fraction currently on Entresto and Coreg. He reports BP has been soft recently and Entresto and Coreg dosing were decreased. Kidney function has been stable (he is following with nephrology). He reports breathing has been stable, denies worsening dyspnea, orthopnea or abdominal or LE swelling.  Recent echo with recovered LVEF of 56 to 60%.      Multivessel CAD.  He remains on aspirin and Plavix and denies bleeding issues.  He was initially referred to cardiothoracic surgeon Dr. Odonnell who recommended patient see Dr. Del Rio at  due to high risk.  Dr. Del Rio felt he was high risk as well and referred to interventional cardiology for considerations of repeat cardiac cath and possible PCI.  Successful intravascular lithotripsy assisted PCI to severe calcific 90% mid RCA stenosis with placement of a 3.0 x 30 mm [place] drug-eluting stent, postdilated in the proximal to mid segments to 3.5 mm, severe ostial left circumflex disease not intervened upon and medical management was recommended.  He reports doing well status post cath and feels his symptoms are improved.  He denies chest pain and feels his breathing is stable.  He reports compliance with medications.  He remains on aspirin, Imdur, Plavix, Coreg and Lipitor.      COPD following with pulmonology. Moderate pulmonary hypertension noted on recent echo likely related to COPD.  Discussed the importance of following with pulmonology and adherence to medical therapy.  Former smoker.  Patient Active Problem List   Diagnosis    Ischemic cardiomyopathy    Heart failure with reduced ejection fraction     Coronary artery disease of native artery of native heart with stable angina pectoris    Presence of external cardiac defibrillator    Primary hypertension    Chronic obstructive pulmonary disease     Past Medical History:   Diagnosis Date    Chronic kidney disease     COPD (chronic obstructive pulmonary disease)     Hypertension     Myocardial infarction     Stroke      Past Surgical History:   Procedure Laterality Date    APPENDECTOMY      HEMORRHOIDECTOMY      TONSILLECTOMY         Family History   Problem Relation Age of Onset    Heart disease Mother     Arthritis Brother      Social History     Tobacco Use    Smoking status: Former     Packs/day: 2.00     Years: 45.00     Additional pack years: 0.00     Total pack years: 90.00     Types: Cigarettes     Quit date: 4/1/2020     Years since quitting: 3.8     Passive exposure: Past    Smokeless tobacco: Never   Vaping Use    Vaping Use: Never used   Substance Use Topics    Alcohol use: Not Currently     Comment: qnce a week    Drug use: Not Currently         The following portions of the patient's history were reviewed and updated as appropriate: allergies, current medications, past family history, past medical history, past social history, past surgical history and problem list.    No Known Allergies      Current Outpatient Medications:     Aspirin Low Dose 81 MG EC tablet, Take 1 tablet by mouth Daily., Disp: , Rfl:     atorvastatin (LIPITOR) 40 MG tablet, Take 2 tablets by mouth Daily. for cholesterol, Disp: 90 tablet, Rfl: 0    Budeson-Glycopyrrol-Formoterol (Breztri Aerosphere) 160-9-4.8 MCG/ACT aerosol inhaler, Inhale 2 puffs 2 (Two) Times a Day., Disp: 10.7 g, Rfl: 5    carvedilol (COREG) 25 MG tablet, Take 1 tablet by mouth 2 (Two) Times a Day. (Patient taking differently: Take 0.5 tablets by mouth 2 (Two) Times a Day.), Disp: 180 tablet, Rfl: 1    clopidogrel (PLAVIX) 75 MG tablet, Take 1 tablet by mouth Daily., Disp: 90 tablet, Rfl: 0    Earwax Removal  "6.5 % otic solution, INSTILL 4 DROPS IN AFFECTED EAR(S) TWO TIMES A DAY FOR 5 DAYS, Disp: , Rfl:     isosorbide mononitrate (IMDUR) 30 MG 24 hr tablet, Take 1 tablet by mouth Daily., Disp: 90 tablet, Rfl: 0    Kerendia 10 MG tablet, Take 1 tablet by mouth Daily., Disp: , Rfl:     sacubitril-valsartan (Entresto) 49-51 MG tablet, Take 0.5 tablets by mouth 2 (Two) Times a Day., Disp: 180 tablet, Rfl: 1    tamsulosin (FLOMAX) 0.4 MG capsule 24 hr capsule, TAKE ONE CAPSULE BY MOUTH DAILY FOR PROSTATE, Disp: , Rfl:     Ventolin  (90 Base) MCG/ACT inhaler, Inhale 2 puffs Every 4 (Four) Hours As Needed for Wheezing., Disp: 18 g, Rfl: 5    Review of Systems   Constitutional:  Negative for activity change, appetite change, chills, diaphoresis, fatigue and fever.   HENT:  Negative for congestion, drooling, ear discharge, ear pain, mouth sores, nosebleeds, postnasal drip, rhinorrhea, sinus pressure, sneezing and sore throat.    Eyes:  Negative for pain, discharge and visual disturbance.   Respiratory:  Negative for cough, chest tightness, shortness of breath and wheezing.    Cardiovascular:  Negative for chest pain, palpitations and leg swelling.   Gastrointestinal:  Negative for abdominal pain, constipation, diarrhea, nausea and vomiting.   Endocrine: Negative for cold intolerance, heat intolerance, polydipsia, polyphagia and polyuria.   Musculoskeletal:  Negative for arthralgias, myalgias and neck pain.   Skin:  Negative for rash and wound.   Neurological:  Negative for dizziness, syncope, speech difficulty, weakness, light-headedness and headaches.   Hematological:  Negative for adenopathy. Does not bruise/bleed easily.   Psychiatric/Behavioral:  Negative for confusion, dysphoric mood and sleep disturbance. The patient is not nervous/anxious.    All other systems reviewed and are negative.    /80 (BP Location: Left arm, Patient Position: Sitting, Cuff Size: Adult)   Pulse 68   Ht 167.6 cm (66\")   Wt 74.8 kg " (165 lb)   SpO2 98%   BMI 26.63 kg/m²     BP recheck 128/78     Objective   No Known Allergies    Physical Exam  Vitals reviewed.   Constitutional:       Appearance: Normal appearance. He is well-developed.   HENT:      Head: Normocephalic.   Eyes:      Conjunctiva/sclera: Conjunctivae normal.   Neck:      Thyroid: No thyromegaly.      Vascular: No carotid bruit or JVD.   Cardiovascular:      Rate and Rhythm: Normal rate and regular rhythm.   Pulmonary:      Effort: Pulmonary effort is normal.      Breath sounds: Normal breath sounds.   Musculoskeletal:      Cervical back: Neck supple.      Right lower leg: No edema.      Left lower leg: No edema.   Skin:     General: Skin is warm and dry.   Neurological:      Mental Status: He is alert and oriented to person, place, and time.   Psychiatric:         Attention and Perception: Attention normal.         Mood and Affect: Mood normal.         Speech: Speech normal.         Behavior: Behavior normal. Behavior is cooperative.         Cognition and Memory: Cognition normal.           ECG 12 Lead    Date/Time: 2/6/2024 6:00 PM  Performed by: Elinor Walls APRN    Authorized by: Elinor Walls APRN  Comparison: compared with previous ECG   Similar to previous ECG  Comparison to previous ECG: ST changes are noted in V5 and V6 but improved as compared to previous EKG   Rhythm: sinus rhythm  Rate: normal  BPM: 69  Other findings: non-specific ST-T wave changes and T wave abnormality    Clinical impression: myocardial ischemia  Clinical impression comment: consider  Comments: QT/QTc - 432/451          LABS  WBC   Date Value Ref Range Status   01/11/2024 6.97 3.70 - 10.30 10*3/uL Final     RBC   Date Value Ref Range Status   01/11/2024 4.14 (L) 4.60 - 6.10 10*6/uL Final     Hemoglobin   Date Value Ref Range Status   01/11/2024 11.8 (L) 13.7 - 17.5 g/dL Final     Hematocrit   Date Value Ref Range Status   01/11/2024 36.6 (L) 40.0 - 51.0 % Final     MCV   Date Value Ref  Range Status   01/11/2024 88 79 - 98 fL Final     MCH   Date Value Ref Range Status   01/11/2024 28.5 26.0 - 32.0 pg Final     MCHC   Date Value Ref Range Status   01/11/2024 32.2 30.7 - 35.5 g/dL Final     RDW   Date Value Ref Range Status   01/11/2024 13.4 11.5 - 14.5 % Final     RDW-SD   Date Value Ref Range Status   05/14/2017 43.0 37.0 - 54.0 fl Final     MPV   Date Value Ref Range Status   01/11/2024 8.4 (L) 8.8 - 12.5 fL Final     Platelets   Date Value Ref Range Status   01/11/2024 214 155 - 369 10*3/uL Final     Neutrophil %   Date Value Ref Range Status   05/14/2017 52.1 30.0 - 70.0 % Final     Lymphocyte %   Date Value Ref Range Status   05/14/2017 35.6 21.0 - 51.0 % Final     Monocyte %   Date Value Ref Range Status   05/14/2017 8.3 0.0 - 10.0 % Final     Eosinophil %   Date Value Ref Range Status   05/14/2017 3.0 0.0 - 5.0 % Final     Basophil %   Date Value Ref Range Status   05/14/2017 0.9 0.0 - 2.0 % Final     Immature Grans %   Date Value Ref Range Status   05/14/2017 0.1 0.0 - 0.5 % Final     Neutrophils, Absolute   Date Value Ref Range Status   05/14/2017 3.93 1.40 - 6.50 10*3/mm3 Final     Lymphocytes, Absolute   Date Value Ref Range Status   05/14/2017 2.69 1.00 - 3.00 10*3/mm3 Final     Monocytes, Absolute   Date Value Ref Range Status   05/14/2017 0.63 0.10 - 0.90 10*3/mm3 Final     Eosinophils, Absolute   Date Value Ref Range Status   05/14/2017 0.23 0.00 - 0.70 10*3/mm3 Final     Basophils, Absolute   Date Value Ref Range Status   05/14/2017 0.07 0.00 - 0.30 10*3/mm3 Final     Immature Grans, Absolute   Date Value Ref Range Status   05/14/2017 0.01 0.00 - 0.03 10*3/mm3 Final     nRBC   Date Value Ref Range Status   01/11/2024 0.0 <=0.0 per 100 WBCs Final       Total Cholesterol   Date Value Ref Range Status   11/16/2023 144 0 - 200 mg/dL Final     Triglycerides   Date Value Ref Range Status   11/16/2023 52 0 - 150 mg/dL Final     HDL Cholesterol   Date Value Ref Range Status   11/16/2023 47  40 - 60 mg/dL Final     LDL Cholesterol    Date Value Ref Range Status   11/16/2023 86 0 - 100 mg/dL Final     IMAGING  Adult Transthoracic Echo Complete W/ Cont if Necessary Per Protocol    Addendum Date: 11/16/2023      Normal left ventricular cavity size with moderate concentric hypertrophy noted   Left ventricular systolic function is normal.  LV ejection fraction is around 56- 60%   Left ventricular diastolic function is consistent with (grade I) impaired relaxation.   Physiological tricuspid regurgitation with moderate pulmonary hypertension (peak RV systolic pressure is 45 to 55 mmHg).   No significant valvular heart disease noted.   No pericardial effusion detected.     Addendum Date: 11/16/2023      Normal left ventricular cavity size with moderate concentric hypertrophy noted   Left ventricular systolic function is normal.  LV ejection fraction is around 60%   Left ventricular diastolic function is consistent with (grade I) impaired relaxation.   Physiological tricuspid regurgitation with moderate pulmonary hypertension (peak RV systolic pressure is 45 to 55 mmHg).   No significant valvular heart disease noted.   No pericardial effusion detected.       Imaging Results - CORONARY ANGIOGRAPHY (01/11/2024 9:37 AM EST)  Narrative   01/12/2024 11:50 AM EST   This result has an attachment that is not available.   Conclusion:  1. Refractory angina in the setting of severe two vessel CAD, deemed not  to be a candidate for surgical revascularization due to multiple  comorbidities.  2. Successful intravascular lithotripsy-assisted PCI to severe calcific  90% mid RCA stenosis with placement of a 3.0 x 30 mm [place]  drug-eluting stent, post-dilated in the proximal to mid segments to 3.5  mm.  3. Severe ostial left circumflex disease, not intervened upon.  4. Successful hemostasis of 6F right radial arteriotomy with TR band.    Recommendations:  1. Routine vascular access and post-catheterization care per  "protocol.    2. Continue DAPT with aspirin and clopidogrel for at least 6 months,  followed by clopidogrel monotherapy indefinitely thereafter.  3. Comprehensive cardiovascular risk factor reduction including weight  loss, dietary counseling, tobacco cessation, glycemic control, management  of hypertension and dyslipidemia, as applicable.    4. Medical management of residual circumflex disease.  5. Outpatient follow-up with Dr. Amaya in 4 weeks.    Procedure Details  After informed consent was obtained, the patient was brought to the cardiac catheterization laboratory. A time out was done to confirm the correct patient, site and procedure. The patient's right wrist was prepped and draped in sterile fashion. The skin overlying the patient's right radial region was anesthetized with 1% lidocaine. Using modified Seldinger technique, a 21-gauge micropuncture needle was used to puncture the right radial artery and 0.021\" guidewire was advanced into the radial artery without difficulty. A 6F short sheath was advanced over a guidewire and flushed. At that time a cocktail of heparin and nitroglycerin was given through the radial sheath. A 6F JR 4 guide catheter was advanced into the ascending aorta over a 0.035'' J-tipped guidewire and used to engage the right coronary. Reference coronary angiography was obtained using a small amount of contrast, injected by hand. After confirming therapeutic activated clotting time, a Jesús Blue wire was advanced beyond the lesion and into the PDA. We advanced an Emerge RX 2.0 x 12 mm non-compliant balloon into lesion and inflated to 16 kel. The balloon was withdrawn slightly and serial inflations performed using the same technique. Additional pre-dilation was then performed using 2.5 x 15, 2.75 x 20, and 3.0 x 15 mm NC balloons in similar fashion as above. Given the continued under expansion of the lesion, we decided to proceed with intracoronary shockwave lithotripsy for further plaque " modification. We inserted a 3.0 x 12 mm Shockwave balloon through a 6F Guideliner and delivered 8 sets of 10 pulses with each run throughout the stenosis, with balloon inflated to 4-6 kel during delivery of the pulsations. We then performed angiography to ensure no perforation or dissection. A new 3.0 x 15 mm NC balloon was then inserted and additional pre-dilation performed at up to 22 kel. We then advanced a 3.0 x 30 mm [place] drug-eluting stent into the lesion and deployed at 16 kel. Intravascular ultrasound (IVUS) was then performed and demonstrated appropriate stent expansion and apposition with no evidence of edge dissection. The distal stent was then post-dilated with a 3.0 x 15 mm NC balloon, inflated to 16 kel. The proximal to mid stent segments were then post-dilated using a 3.5 x 20 mm NC balloon, inflated to 18 kel. The balloon and wire were removed and final angiography was performed to ensure that there was no perforation, dissection or side branch loss. The guide catheter was removed over a wire. A TR band was placed, and the radial artery introducer was removed. The TR band was inflated with 12 mL of air. The patient was transferred back to the cath lab holding area in good condition.    Coronary Findings Diagnostic Dominance: Right  Right Coronary Artery: Large caliber vessel which originates from the right coronary cusp and is dominant for inferior circulation, giving rise to a PDA and small posterolateral branch at its distal bifurcation. The vessel is heavily calcified with eccentric, tubular 90% stenosis in the mid segment, followed by sequential 40% stenoses in the distal vessel. The PDA is small caliber and has a 50% stenosis in its mid portion. Mid RCA lesion is 90% stenosed. ASHU flow is 3. The lesion is type C. calcified The lesion is severe.    Intervention  Mid RCA lesion: Angioplasty: Angioplasty using noncompliant was performed prior to stent deployment. The balloon used was a  CATHETER BALLOON EMERGE NC MR 2X15MM. Maximum pressure: 16 kel. Inflation time: 10 sec. Angioplasty: Angioplasty using noncompliant was performed prior to stent deployment. The balloon used was a CATHETER BALLOON EMERGE NC MR 2.5X15MM. Maximum pressure: 16 kel. Inflation time: 10 sec. Angioplasty: Angioplasty using noncompliant was performed prior to stent deployment. The balloon used was a CATHETER BALLOON EMERGE NC MR 2.09C85LI. Maximum pressure: 20 kel. Inflation time: 10 sec. Angioplasty: Angioplasty using noncompliant was performed prior to stent deployment. The balloon used was a CATHETER BALLOON EMERGE MR 3X15MM. Maximum pressure: 22 kel. Inflation time: 10 sec. Angioplasty: Angioplasty using standard was performed prior to stent deployment. The balloon used was a CATHETER IVL SHOCKWAVE C2 3.0MM X 12MM. Maximum pressure: 6 kel. Inflation time: 10 sec. Stent: Drug-eluting stent was successfully placed. The stent used was a STENT CORONARY FRONTIER DELPHINE RX 3.00MM X 30MM. Stent was deployed by way of balloon expansion. Maximum pressure: 16 kel. Inflation time: 12 sec. Angioplasty: Angioplasty using noncompliant was performed following stent deployment. The balloon used was a CATHETER BALLOON EMERGE MR 3X15MM. Maximum pressure: 16 kel. Inflation time: 10 sec. Angioplasty: Angioplasty using noncompliant was performed following stent deployment. The balloon used was a CATHETER BALLOON EMERGE NC MR 3.5X20MM. Maximum pressure: 18 kel. Inflation time: 10 sec. Post-Intervention Lesion Assessment: The intervention was successful. Post-intervention ASHU flow is 3. There were no complications. Ultrasound (IVUS) was performed. Minimum stent area: 8.2 mm². There is a 0% residual stenosis post intervention.           Assessment & Plan   Diagnoses and all orders for this visit:    1. Ischemic cardiomyopathy (Primary)  -     BNP; Future  Continue on Entresto, Coreg, aspirin  Patient is clinically asymptomatic, will continue to  monitor    2. CAD, multiple vessel  -     Comprehensive Metabolic Panel; Future  -     Lipid Panel; Future  -     CK; Future  -     CBC & Differential; Future  -     ECG 12 Lead  Status post stent, doing well  EKG reviewed and discussed, ST changes have improved  Continue aspirin, Plavix, Coreg, Imdur and statin  As recommended will continue on DAPT for 6 months and then Plavix indefinitely    3. Chronic heart failure with preserved ejection fraction  LVEF has improved, will continue to monitor    4. Primary hypertension  Controlled, continue current therapy  Routine monitoring recommended    5. Stage 3 chronic kidney disease, unspecified whether stage 3a or 3b CKD  Management per nephrology          Lifestyle modifications including heart healthy diet, regular exercise, maintenance of desirable body weight and avoidance of tobacco products    Follow-up in 3 months, sooner if needed

## 2024-02-07 ENCOUNTER — TELEPHONE (OUTPATIENT)
Dept: CARDIOLOGY | Facility: CLINIC | Age: 64
End: 2024-02-07
Payer: MEDICAID

## 2024-02-07 RX ORDER — ISOSORBIDE MONONITRATE 30 MG/1
30 TABLET, EXTENDED RELEASE ORAL DAILY
Qty: 90 TABLET | Refills: 0 | Status: SHIPPED | OUTPATIENT
Start: 2024-02-07

## 2024-02-07 RX ORDER — CLOPIDOGREL BISULFATE 75 MG/1
75 TABLET ORAL DAILY
Qty: 90 TABLET | Refills: 0 | Status: SHIPPED | OUTPATIENT
Start: 2024-02-07

## 2024-02-15 RX ORDER — CLOPIDOGREL BISULFATE 75 MG/1
75 TABLET ORAL DAILY
Qty: 90 TABLET | Refills: 0 | Status: SHIPPED | OUTPATIENT
Start: 2024-02-15

## 2024-02-27 PROBLEM — I21.4: Chronic | Status: ACTIVE | Noted: 2023-03-29

## 2024-02-27 PROBLEM — I25.119 CORONARY ARTERY DISEASE INVOLVING NATIVE CORONARY ARTERY OF NATIVE HEART WITH ANGINA PECTORIS: Status: ACTIVE | Noted: 2023-05-12

## 2024-02-27 PROBLEM — R09.02 OXYGEN DEFICIENCY: Status: ACTIVE | Noted: 2023-11-30

## 2024-02-27 PROBLEM — N28.9 KIDNEY DISEASE: Status: ACTIVE | Noted: 2023-11-30

## 2024-02-27 PROBLEM — R55 SYNCOPE AND COLLAPSE: Status: ACTIVE | Noted: 2023-11-30

## 2024-02-27 PROBLEM — Z99.81 ON HOME O2: Status: ACTIVE | Noted: 2023-11-30

## 2024-03-04 ENCOUNTER — OFFICE VISIT (OUTPATIENT)
Dept: PULMONOLOGY | Facility: CLINIC | Age: 64
End: 2024-03-04
Payer: MEDICAID

## 2024-03-04 VITALS
TEMPERATURE: 96.7 F | OXYGEN SATURATION: 94 % | HEART RATE: 100 BPM | SYSTOLIC BLOOD PRESSURE: 138 MMHG | DIASTOLIC BLOOD PRESSURE: 82 MMHG | HEIGHT: 66 IN | WEIGHT: 165 LBS | BODY MASS INDEX: 26.52 KG/M2

## 2024-03-04 DIAGNOSIS — Z12.2 ENCOUNTER FOR SCREENING FOR LUNG CANCER: ICD-10-CM

## 2024-03-04 DIAGNOSIS — J44.9 CHRONIC OBSTRUCTIVE PULMONARY DISEASE, UNSPECIFIED COPD TYPE: Primary | ICD-10-CM

## 2024-03-04 DIAGNOSIS — F17.211 CIGARETTE NICOTINE DEPENDENCE IN REMISSION: ICD-10-CM

## 2024-03-04 DIAGNOSIS — E66.3 OVERWEIGHT: ICD-10-CM

## 2024-03-04 PROCEDURE — 3075F SYST BP GE 130 - 139MM HG: CPT | Performed by: NURSE PRACTITIONER

## 2024-03-04 PROCEDURE — 1160F RVW MEDS BY RX/DR IN RCRD: CPT | Performed by: NURSE PRACTITIONER

## 2024-03-04 PROCEDURE — 99214 OFFICE O/P EST MOD 30 MIN: CPT | Performed by: NURSE PRACTITIONER

## 2024-03-04 PROCEDURE — 1159F MED LIST DOCD IN RCRD: CPT | Performed by: NURSE PRACTITIONER

## 2024-03-04 PROCEDURE — 3079F DIAST BP 80-89 MM HG: CPT | Performed by: NURSE PRACTITIONER

## 2024-03-04 RX ORDER — BUDESONIDE, GLYCOPYRROLATE, AND FORMOTEROL FUMARATE 160; 9; 4.8 UG/1; UG/1; UG/1
2 AEROSOL, METERED RESPIRATORY (INHALATION) 2 TIMES DAILY
Qty: 10.7 G | Refills: 5 | Status: SHIPPED | OUTPATIENT
Start: 2024-03-04

## 2024-03-04 RX ORDER — ALBUTEROL SULFATE 90 UG/1
2 AEROSOL, METERED RESPIRATORY (INHALATION) EVERY 4 HOURS PRN
Qty: 18 G | Refills: 5 | Status: SHIPPED | OUTPATIENT
Start: 2024-03-04

## 2024-03-04 NOTE — PROGRESS NOTES
"Chief Complaint  Chronic obstructive pulmonary disease, unspecified COPD type    Subjective        Florencio Cintron presents to [facility] PULMONARY [facility]ITICAL [facility]ARE MEDICINE  History of Present Illness    Mr. Cintron is a 63 year old male with a medical history significant for COPD, CKD, hypertension, MI, and stroke.    He presents today for follow up on COPD.  He states that he has a cardiac stent placed since his last visit and that his breathing has improved.  He is currently taking Breztri BID and albuterol as needed.        Objective   Vital Signs:  /82   Pulse 100   Temp 96.7 °F (35.9 °C)   Ht 167.6 cm (65.98\")   Wt 74.8 kg (165 lb)   SpO2 94%   BMI 26.64 kg/m²   Estimated body mass index is 26.64 kg/m² as calculated from the following:    Height as of this encounter: 167.6 cm (65.98\").    Weight as of this encounter: 74.8 kg (165 lb).               Physical Exam     GENERAL APPEARANCE: Well developed, well nourished, alert and cooperative, and appears to be in no acute distress.    HEAD: normocephalic. Atraumatic.    EYES: PERRL, EOMI. Vision is grossly intact.    THROAT: Oral cavity and pharynx normal. No inflammation, swelling, exudate, or lesions.     NECK: Neck supple.  No thyromegaly.    CARDIAC: Normal S1 and S2. No S3, S4 or murmurs. Rhythm is regular.     RESPIRATORY:Bilateral air entry positive. Bilateral diminished breath sounds. No wheezing, crackles or rhonchi noted.    GI: Positive bowel sounds. Soft, nondistended, nontender.     MUSCULOSKELETAL: No significant deformity or joint abnormality. No edema. Peripheral pulses intact. No varicosities.    NEUROLOGICAL: Strength and sensation symmetric and intact throughout.     PSYCHIATRIC: The mental examination revealed the patient was oriented to person, place, and time.     Result Review :    The following data was reviewed by: OLIVIA Hunter on 03/04/2024:  Common labs          8/2/2023    10:32 11/16/2023    " 08:20 1/11/2024    06:50 1/11/2024    06:53   Common Labs   Glucose 103  135      BUN 35  25      Creatinine 1.79  1.17      EGFR  Am   62        Sodium 139  139      Potassium 4.7  4.0      Chloride 104  105      Calcium 8.8  9.4      Albumin 3.4  3.4      Total Bilirubin 0.2  0.3      Alkaline Phosphatase 90  97      AST (SGOT) 8  10      ALT (SGPT) 8  10      WBC    6.97       Hemoglobin    11.8       Hematocrit    36.6       Platelets    214       Total Cholesterol  144      Triglycerides  52      HDL Cholesterol  47      LDL Cholesterol   86      Microalbumin, Urine 100.7          Details          This result is from an external source.                          Assessment and Plan     Diagnoses and all orders for this visit:    1. Chronic obstructive pulmonary disease, unspecified COPD type (Primary)    2. Overweight    3. Cigarette nicotine dependence in remission  -     CT chest low dose wo; Future    4. Encounter for screening for lung cancer  -     CT chest low dose wo; Future    Other orders  -     Ventolin  (90 Base) MCG/ACT inhaler; Inhale 2 puffs Every 4 (Four) Hours As Needed for Wheezing.  Dispense: 18 g; Refill: 5  -     Budeson-Glycopyrrol-Formoterol (Breztri Aerosphere) 160-9-4.8 MCG/ACT aerosol inhaler; Inhale 2 puffs 2 (Two) Times a Day.  Dispense: 10.7 g; Refill: 5          PFT is notable for severe obstruction.  Continue Breztri BID.  Continue albuterol as needed.    Sent in refills.    Ordered LDCT to be completed in April 2024.    Follow Up     Return in about 6 months (around 9/4/2024).  Patient was given instructions and counseling regarding his condition or for health maintenance advice. Please see specific information pulled into the AVS if appropriate.

## 2024-07-08 ENCOUNTER — HOSPITAL ENCOUNTER (EMERGENCY)
Facility: HOSPITAL | Age: 64
Discharge: HOME | End: 2024-07-09
Payer: MEDICAID

## 2024-07-08 ENCOUNTER — APPOINTMENT (OUTPATIENT)
Dept: ULTRASOUND IMAGING | Facility: HOSPITAL | Age: 64
End: 2024-07-08
Payer: MEDICAID

## 2024-07-08 DIAGNOSIS — I50.9 HEART FAILURE, UNSPECIFIED HF CHRONICITY, UNSPECIFIED HEART FAILURE TYPE: ICD-10-CM

## 2024-07-08 DIAGNOSIS — K40.90 INGUINAL HERNIA OF LEFT SIDE WITHOUT OBSTRUCTION OR GANGRENE: Primary | ICD-10-CM

## 2024-07-08 DIAGNOSIS — I10 HTN (HYPERTENSION), MALIGNANT: ICD-10-CM

## 2024-07-08 LAB
ALBUMIN SERPL-MCNC: 3.3 G/DL (ref 3.5–5.2)
ALBUMIN/GLOB SERPL: 1.1 G/DL
ALP SERPL-CCNC: 91 U/L (ref 39–117)
ALT SERPL W P-5'-P-CCNC: 13 U/L (ref 1–41)
ANION GAP SERPL CALCULATED.3IONS-SCNC: 9.9 MMOL/L (ref 5–15)
AST SERPL-CCNC: 14 U/L (ref 1–40)
BASOPHILS # BLD AUTO: 0.05 10*3/MM3 (ref 0–0.2)
BASOPHILS NFR BLD AUTO: 0.6 % (ref 0–1.5)
BILIRUB SERPL-MCNC: 0.2 MG/DL (ref 0–1.2)
BUN SERPL-MCNC: 18 MG/DL (ref 8–23)
BUN/CREAT SERPL: 15.1 (ref 7–25)
CALCIUM SPEC-SCNC: 9.2 MG/DL (ref 8.6–10.5)
CHLORIDE SERPL-SCNC: 108 MMOL/L (ref 98–107)
CO2 SERPL-SCNC: 23.1 MMOL/L (ref 22–29)
CREAT SERPL-MCNC: 1.19 MG/DL (ref 0.76–1.27)
D-LACTATE SERPL-SCNC: 0.8 MMOL/L (ref 0.5–2)
DEPRECATED RDW RBC AUTO: 44.6 FL (ref 37–54)
EGFRCR SERPLBLD CKD-EPI 2021: 69.1 ML/MIN/1.73
EOSINOPHIL # BLD AUTO: 0.35 10*3/MM3 (ref 0–0.4)
EOSINOPHIL NFR BLD AUTO: 4.1 % (ref 0.3–6.2)
ERYTHROCYTE [DISTWIDTH] IN BLOOD BY AUTOMATED COUNT: 13.6 % (ref 12.3–15.4)
GEN 5 2HR TROPONIN T REFLEX: 45 NG/L
GLOBULIN UR ELPH-MCNC: 3.1 GM/DL
GLUCOSE SERPL-MCNC: 107 MG/DL (ref 65–99)
HCT VFR BLD AUTO: 37.9 % (ref 37.5–51)
HGB BLD-MCNC: 12.4 G/DL (ref 13–17.7)
HOLD SPECIMEN: NORMAL
HOLD SPECIMEN: NORMAL
IMM GRANULOCYTES # BLD AUTO: 0.02 10*3/MM3 (ref 0–0.05)
IMM GRANULOCYTES NFR BLD AUTO: 0.2 % (ref 0–0.5)
LIPASE SERPL-CCNC: 32 U/L (ref 13–60)
LYMPHOCYTES # BLD AUTO: 1.84 10*3/MM3 (ref 0.7–3.1)
LYMPHOCYTES NFR BLD AUTO: 21.4 % (ref 19.6–45.3)
MCH RBC QN AUTO: 29.7 PG (ref 26.6–33)
MCHC RBC AUTO-ENTMCNC: 32.7 G/DL (ref 31.5–35.7)
MCV RBC AUTO: 90.7 FL (ref 79–97)
MONOCYTES # BLD AUTO: 0.73 10*3/MM3 (ref 0.1–0.9)
MONOCYTES NFR BLD AUTO: 8.5 % (ref 5–12)
NEUTROPHILS NFR BLD AUTO: 5.59 10*3/MM3 (ref 1.7–7)
NEUTROPHILS NFR BLD AUTO: 65.2 % (ref 42.7–76)
NRBC BLD AUTO-RTO: 0 /100 WBC (ref 0–0.2)
PLATELET # BLD AUTO: 322 10*3/MM3 (ref 140–450)
PMV BLD AUTO: 8.2 FL (ref 6–12)
POTASSIUM SERPL-SCNC: 3.3 MMOL/L (ref 3.5–5.2)
PROT SERPL-MCNC: 6.4 G/DL (ref 6–8.5)
RBC # BLD AUTO: 4.18 10*6/MM3 (ref 4.14–5.8)
SODIUM SERPL-SCNC: 141 MMOL/L (ref 136–145)
TROPONIN T DELTA: 0 NG/L
TROPONIN T SERPL HS-MCNC: 45 NG/L
WBC NRBC COR # BLD AUTO: 8.58 10*3/MM3 (ref 3.4–10.8)
WHOLE BLOOD HOLD COAG: NORMAL
WHOLE BLOOD HOLD SPECIMEN: NORMAL

## 2024-07-08 PROCEDURE — 83690 ASSAY OF LIPASE: CPT

## 2024-07-08 PROCEDURE — 84484 ASSAY OF TROPONIN QUANT: CPT

## 2024-07-08 PROCEDURE — 83605 ASSAY OF LACTIC ACID: CPT

## 2024-07-08 PROCEDURE — 76870 US EXAM SCROTUM: CPT | Performed by: RADIOLOGY

## 2024-07-08 PROCEDURE — 99284 EMERGENCY DEPT VISIT MOD MDM: CPT

## 2024-07-08 PROCEDURE — 80053 COMPREHEN METABOLIC PANEL: CPT

## 2024-07-08 PROCEDURE — 36415 COLL VENOUS BLD VENIPUNCTURE: CPT

## 2024-07-08 PROCEDURE — 85025 COMPLETE CBC W/AUTO DIFF WBC: CPT

## 2024-07-08 PROCEDURE — 76870 US EXAM SCROTUM: CPT

## 2024-07-08 RX ORDER — SODIUM CHLORIDE 0.9 % (FLUSH) 0.9 %
10 SYRINGE (ML) INJECTION AS NEEDED
Status: DISCONTINUED | OUTPATIENT
Start: 2024-07-08 | End: 2024-07-09 | Stop reason: HOSPADM

## 2024-07-08 NOTE — Clinical Note
[facility] EMERGENCY DEPARTMENT  [address] [address]  Phone: [phone]    Bonita Cintron accompanied Florencio Cintron to the emergency department on 7/8/2024. They may return to work on 07/10/2024.        Thank you for choosing [facility].    Taco Tripp, DO

## 2024-07-08 NOTE — Clinical Note
[facility] EMERGENCY DEPARTMENT  [address] [facility] [address]  Phone: [phone]    Bonita Cintron accompanied Florencio Cintron to the emergency department on 7/8/2024. They may return to work on 07/10/2024.        Thank you for choosing [facility].    Taco Tripp, DO

## 2024-07-08 NOTE — Clinical Note
[facility] EMERGENCY DEPARTMENT  [address] [address]  Phone: [phone]    Florencio Cintron was seen and treated in our emergency department on 7/8/2024.  He may return to work on 07/10/2024.  Patient was seen in the ER this morning 07/09/2024, please accommodate as possible.        Thank you for choosing [facility].    Taco Tripp, DO

## 2024-07-08 NOTE — Clinical Note
[facility] EMERGENCY DEPARTMENT  [address] [facility] [address]  Phone: [phone]    Florencio Cintron was seen and treated in our emergency department on 7/8/2024.  He may return to work on 07/10/2024.  Patient was seen in the ER this morning 07/09/2024, please accommodate as possible.        Thank you for choosing [facility].    Taco Tripp, DO

## 2024-07-09 VITALS
BODY MASS INDEX: 24.43 KG/M2 | DIASTOLIC BLOOD PRESSURE: 59 MMHG | RESPIRATION RATE: 18 BRPM | TEMPERATURE: 98 F | OXYGEN SATURATION: 98 % | WEIGHT: 152 LBS | HEIGHT: 66 IN | SYSTOLIC BLOOD PRESSURE: 109 MMHG | HEART RATE: 74 BPM

## 2024-07-09 LAB — TROPONIN T SERPL HS-MCNC: 49 NG/L

## 2024-07-09 PROCEDURE — 96374 THER/PROPH/DIAG INJ IV PUSH: CPT

## 2024-07-09 PROCEDURE — 25010000002 HYDRALAZINE PER 20 MG

## 2024-07-09 PROCEDURE — 96375 TX/PRO/DX INJ NEW DRUG ADDON: CPT

## 2024-07-09 PROCEDURE — 84484 ASSAY OF TROPONIN QUANT: CPT

## 2024-07-09 PROCEDURE — 25010000002 KETOROLAC TROMETHAMINE PER 15 MG

## 2024-07-09 RX ORDER — HYDRALAZINE HYDROCHLORIDE 20 MG/ML
10 INJECTION INTRAMUSCULAR; INTRAVENOUS ONCE
Status: COMPLETED | OUTPATIENT
Start: 2024-07-09 | End: 2024-07-09

## 2024-07-09 RX ORDER — SACUBITRIL AND VALSARTAN 49; 51 MG/1; MG/1
1 TABLET, FILM COATED ORAL 2 TIMES DAILY
Qty: 60 TABLET | Refills: 0 | Status: SHIPPED | OUTPATIENT
Start: 2024-07-09

## 2024-07-09 RX ORDER — CLOPIDOGREL BISULFATE 75 MG/1
75 TABLET ORAL DAILY
Status: DISCONTINUED | OUTPATIENT
Start: 2024-07-09 | End: 2024-07-09 | Stop reason: HOSPADM

## 2024-07-09 RX ORDER — CLOPIDOGREL BISULFATE 75 MG/1
75 TABLET ORAL DAILY
Qty: 30 TABLET | Refills: 0 | Status: SHIPPED | OUTPATIENT
Start: 2024-07-09

## 2024-07-09 RX ORDER — KETOROLAC TROMETHAMINE 30 MG/ML
15 INJECTION, SOLUTION INTRAMUSCULAR; INTRAVENOUS ONCE
Status: COMPLETED | OUTPATIENT
Start: 2024-07-09 | End: 2024-07-09

## 2024-07-09 RX ORDER — CARVEDILOL 25 MG/1
25 TABLET ORAL 2 TIMES DAILY WITH MEALS
Status: DISCONTINUED | OUTPATIENT
Start: 2024-07-09 | End: 2024-07-09 | Stop reason: HOSPADM

## 2024-07-09 RX ORDER — CARVEDILOL 3.12 MG/1
25 TABLET ORAL 2 TIMES DAILY WITH MEALS
Qty: 60 TABLET | Refills: 0 | Status: SHIPPED | OUTPATIENT
Start: 2024-07-09 | End: 2024-08-08

## 2024-07-09 RX ADMIN — CARVEDILOL 25 MG: 25 TABLET, FILM COATED ORAL at 01:03

## 2024-07-09 RX ADMIN — HYDRALAZINE HYDROCHLORIDE 10 MG: 20 INJECTION INTRAMUSCULAR; INTRAVENOUS at 00:42

## 2024-07-09 RX ADMIN — CLOPIDOGREL BISULFATE 75 MG: 75 TABLET, FILM COATED ORAL at 01:03

## 2024-07-09 RX ADMIN — SACUBITRIL AND VALSARTAN 1 TABLET: 49; 51 TABLET, FILM COATED ORAL at 01:03

## 2024-07-09 RX ADMIN — KETOROLAC TROMETHAMINE 15 MG: 30 INJECTION, SOLUTION INTRAMUSCULAR; INTRAVENOUS at 00:43

## 2024-07-09 NOTE — ED PROVIDER NOTES
Subjective   History of Present Illness  63-year-old male with history of CAD, heart failure, hypertension presenting to the ED with groin pain.  Patient has had groin pain for the last 2 weeks he has a history of hernia on the other side and feels he may have a hernia today.  He feels that it has been growing and has been getting more painful over time.  He denies any acute constipation, fever, chills, nausea, vomiting.  Endorses increasing pain and changes in size.  Of note he denies any chest pain, shortness of breath.      Review of Systems   Constitutional: Negative.  Negative for fever.   HENT: Negative.     Respiratory: Negative.     Cardiovascular: Negative.  Negative for chest pain.   Gastrointestinal: Negative.  Negative for abdominal pain.   Endocrine: Negative.    Genitourinary: Negative.  Negative for dysuria.        Inguinal and lower abdominal pain on the left side.   Skin: Negative.    Neurological: Negative.    Psychiatric/Behavioral: Negative.     All other systems reviewed and are negative.      Past Medical History:   Diagnosis Date    Chronic kidney disease     COPD (chronic obstructive pulmonary disease)     Hypertension     Myocardial infarction     Stroke        No Known Allergies    Past Surgical History:   Procedure Laterality Date    APPENDECTOMY      HEMORRHOIDECTOMY      TONSILLECTOMY         Family History   Problem Relation Age of Onset    Heart disease Mother     Arthritis Brother        Social History     Socioeconomic History    Marital status:     Number of children: 3   Tobacco Use    Smoking status: Former     Current packs/day: 0.00     Average packs/day: 2.0 packs/day for 45.0 years (90.0 ttl pk-yrs)     Types: Cigarettes     Start date: 1975     Quit date: 2020     Years since quittin.2     Passive exposure: Past    Smokeless tobacco: Never   Vaping Use    Vaping status: Never Used   Substance and Sexual Activity    Alcohol use: Not Currently     Comment:  qnce a week    Drug use: Not Currently    Sexual activity: Defer           Objective   Physical Exam  Vitals and nursing note reviewed.   Constitutional:       General: He is not in acute distress.     Appearance: He is well-developed. He is not diaphoretic.   HENT:      Head: Normocephalic and atraumatic.      Right Ear: External ear normal.      Left Ear: External ear normal.      Nose: Nose normal.   Eyes:      Conjunctiva/sclera: Conjunctivae normal.      Pupils: Pupils are equal, round, and reactive to light.   Neck:      Vascular: No JVD.      Trachea: No tracheal deviation.   Cardiovascular:      Rate and Rhythm: Normal rate and regular rhythm.      Heart sounds: Normal heart sounds. No murmur heard.  Pulmonary:      Effort: Pulmonary effort is normal. No respiratory distress.      Breath sounds: Normal breath sounds. No wheezing.   Abdominal:      General: Bowel sounds are normal.      Palpations: Abdomen is soft.      Tenderness: There is abdominal tenderness in the left lower quadrant.      Hernia: A hernia is present. Hernia is present in the left inguinal area.   Genitourinary:     Comments: There is a left inguinal hernia which extends into the left scrotum.  Mild tenderness to palpation.  Reducible.  Musculoskeletal:         General: No deformity. Normal range of motion.      Cervical back: Normal range of motion and neck supple.   Skin:     General: Skin is warm and dry.      Coloration: Skin is not pale.      Findings: No erythema or rash.   Neurological:      Mental Status: He is alert and oriented to person, place, and time.      Cranial Nerves: No cranial nerve deficit.   Psychiatric:         Behavior: Behavior normal.         Thought Content: Thought content normal.         Procedures           ED Course  ED Course as of 07/09/24 0203   Mon Jul 08, 2024 2154 High Sensitivity Troponin T(!)  Patient has a history of coronary artery disease and heart failure with several stent placements takes  dual antiplatelet therapy every day.  He denies any chest pain or discomfort or shortness of breath at this time.  Current troponin is lower than his baseline troponin reviewed previous labs. [GF]   Tue Jul 09, 2024 0054 Inguinal ultrasound with Doppler results reviewed via fax impression: Small left hydrocele left scrotal varicoceles vascular heterogeneous structure in the left inguinal canal suggesting hernia further evaluation with CT may be obtained. [GF]   0054 Patient states he has not taken his blood pressure medications in over 1 week. [GF]      ED Course User Index  [GF] Taco Tripp, DO                                             Medical Decision Making  63-year-old male with hypertension, CAD, heart failure, history of inguinal hernia presenting with inguinal hernia on the left side.  Has been worsening over the last 2 weeks.  Scrotal ultrasound which confirms inguinal hernia completed.  Recommended CT scan for additional imaging which patient declines at this time.  Long discussion with patient regarding outpatient follow-up and the importance of a surgical evaluation.  ER precautions given to patient, patient understands when the hernia can become a surgical emergency.  During his visit it was found that he had an elevated troponin and uncontrolled hypertension.  Patient endorses that he has not been taking his medications for the last 2 weeks or so due to a recent split up with his girlfriend.  Troponin elevation is around patient's baseline, reviewed previous labs.  Patient was restarted on his blood pressure medications including his antiplatelet Plavix.  Patient given prescription for his medications. Pt to follow-up outpatient    Amount and/or Complexity of Data Reviewed  Labs: ordered. Decision-making details documented in ED Course.  Radiology: ordered.    Risk  Prescription drug management.        Final diagnoses:   Inguinal hernia of left side without obstruction or gangrene   HTN  (hypertension), malignant   Heart failure, unspecified HF chronicity, unspecified heart failure type       ED Disposition  ED Disposition       ED Disposition   Discharge    Condition   Stable    Comment   --               Brisa Billingsley, APRN  [address]  [facility] [address]  [place] [id number]  [phone]    Schedule an appointment as soon as possible for a visit in 3 days      Amanda Phipps MD  [address]  [facility] [address]  [place] [address]  [phone]    Schedule an appointment as soon as possible for a visit in 1 day      Radha Arellano MD  [address]  [facility] [address]  [place] [address]  [phone]    Schedule an appointment as soon as possible for a visit in 1 day           Medication List        New Prescriptions      Entresto 49-51 MG tablet  Generic drug: sacubitril-valsartan  Take 1 tablet by mouth 2 (Two) Times a Day.            Changed      carvedilol 3.125 MG tablet  Commonly known as: COREG  Take 8 tablets by mouth 2 (Two) Times a Day With Meals for 30 days.  What changed:   medication strength  when to take this               Where to Get Your Medications        These medications were sent to [facility], [place] - [facility] [address] - [phone]  - [phone] FX  [address] [facility] [address]      Phone: [phone]   carvedilol 3.125 MG tablet  clopidogrel 75 MG tablet  Entresto 49-51 MG tablet            Taco Tripp DO  07/09/24 0203

## 2024-07-12 ENCOUNTER — OFFICE VISIT (OUTPATIENT)
Age: 64
End: 2024-07-12
Payer: MEDICAID

## 2024-07-12 VITALS — HEIGHT: 66 IN | WEIGHT: 155 LBS | BODY MASS INDEX: 24.91 KG/M2

## 2024-07-12 DIAGNOSIS — K40.90 LEFT INGUINAL HERNIA: Primary | ICD-10-CM

## 2024-07-13 RX ORDER — SODIUM CHLORIDE 9 MG/ML
40 INJECTION, SOLUTION INTRAVENOUS AS NEEDED
OUTPATIENT
Start: 2024-07-13

## 2024-07-13 RX ORDER — SODIUM CHLORIDE 0.9 % (FLUSH) 0.9 %
3 SYRINGE (ML) INJECTION EVERY 12 HOURS SCHEDULED
OUTPATIENT
Start: 2024-07-13

## 2024-07-13 RX ORDER — SODIUM CHLORIDE 0.9 % (FLUSH) 0.9 %
10 SYRINGE (ML) INJECTION AS NEEDED
OUTPATIENT
Start: 2024-07-13

## 2024-07-13 NOTE — PROGRESS NOTES
Date of Service: 2024    Subjective   Florencio Cintron is a 63 y.o. male is being in consultation today at the request of Brisa Billingsley APRN    Chief Complaint: left inguinal hernia    Florencio Cintron is a 63 y.o. male who presents with a left inguinal hernia. He has noticed progressive swelling and pain over the past several weeks. He presented to the ER due to the pain.     He has previously had a right sided open repair as well as an open appendectomy.     Past Medical History:   Diagnosis Date    Chronic kidney disease     COPD (chronic obstructive pulmonary disease)     Hypertension     Myocardial infarction     Stroke        Past Surgical History:   Procedure Laterality Date    APPENDECTOMY      HEMORRHOIDECTOMY      TONSILLECTOMY           Family History   Problem Relation Age of Onset    Heart disease Mother     Arthritis Brother          Social History     Socioeconomic History    Marital status:     Number of children: 3   Tobacco Use    Smoking status: Former     Current packs/day: 0.00     Average packs/day: 2.0 packs/day for 45.0 years (90.0 ttl pk-yrs)     Types: Cigarettes     Start date: 1975     Quit date: 2020     Years since quittin.2     Passive exposure: Past    Smokeless tobacco: Never   Vaping Use    Vaping status: Never Used   Substance and Sexual Activity    Alcohol use: Not Currently     Comment: qnce a week    Drug use: Not Currently    Sexual activity: Defer          Review of Systems   Pertinent items are noted in HPI     Constitutional: No fevers, chills or malaise. No unintentional weight loss   Eyes: Denies visual changes    Cardiovascular: Denies chest pain, palpitations   Respiratory: Denies cough or shortness of breath   Abdominal/Gastrointestinal: No abdominal pain, no nausea/vomiting   Genitourinary: Denies dysuria or hematuria   Musculoskeletal: Denies any chronic joint aches, pains or deformities              Skin: No lesions or rashes   Psychiatric: No  "recent mood changes   Neurologic: No paresthesias or loss of function        Objective       Physical Exam:      07/12/24  0916   Weight: 70.3 kg (155 lb)    Body mass index is 25.03 kg/m².  Constitution: Ht 167.6 cm (65.98\")   Wt 70.3 kg (155 lb)   BMI 25.03 kg/m²  . No acute distress  Head: Normocephalic, atraumatic.   Eyes: Aligned without strabismus. Conjunctivae noninjected   Ears, Nose, Mouth:no lesions noted  CV: Regular rate and rhythm   Respiratory: Symmetric chest expansion. No respiratory distress.   Gastrointestinal:  Soft, nontender, nondistended   Skin:  No cyanosis, clubbing or edema bilaterally  Neurologic: No gross deficits.  Alert and oriented x3  Psychiatric: Normal mood  Groin: left sided reducible hernia      Assessment   Diagnoses and all orders for this visit:    1. Left inguinal hernia (Primary)  -     Case Request; Standing  -     Follow Anesthesia Guidelines / Protocol; Standing  -     Verify NPO Status; Standing  -     Obtain Informed Consent; Standing  -     Verify / Perform Chlorhexidine Skin Prep; Standing  -     Notify Physician - Standard; Standing  -     Instructions on coughing, deep breathing, and incentive spirometry.; Standing  -     Insert Peripheral IV; Standing  -     Saline Lock & Maintain IV Access; Standing  -     sodium chloride 0.9 % flush 3 mL  -     sodium chloride 0.9 % flush 10 mL  -     sodium chloride 0.9 % infusion 40 mL  -     Place Sequential Compression Device; Standing  -     Maintain Sequential Compression Device; Standing  -     ceFAZolin 2000 mg IVPB in 100 mL NS (VTB)      Florencio Cintron is a 63 y.o. male with a left inguinal hernia. He will require pre-operative clearance from cardiology for inguinal hernia repair. He is scheduled to see them on 7/30. He will need to be off his plavix for 5 days prior to surgery. I have requested he discuss his upcoming OR date with cardiology and we will proceed with OR on 8/7 assuming clearance has been given.      "     Radha Barnes MD  [facility]

## 2024-07-15 PROBLEM — K40.90 LEFT INGUINAL HERNIA: Status: ACTIVE | Noted: 2024-07-12

## 2024-07-30 ENCOUNTER — OFFICE VISIT (OUTPATIENT)
Dept: CARDIOLOGY | Facility: CLINIC | Age: 64
End: 2024-07-30
Payer: MEDICAID

## 2024-07-30 VITALS
DIASTOLIC BLOOD PRESSURE: 78 MMHG | HEART RATE: 91 BPM | SYSTOLIC BLOOD PRESSURE: 126 MMHG | WEIGHT: 144.8 LBS | OXYGEN SATURATION: 97 % | HEIGHT: 66 IN | BODY MASS INDEX: 23.27 KG/M2

## 2024-07-30 DIAGNOSIS — I50.32 CHRONIC HEART FAILURE WITH PRESERVED EJECTION FRACTION: ICD-10-CM

## 2024-07-30 DIAGNOSIS — I10 PRIMARY HYPERTENSION: ICD-10-CM

## 2024-07-30 DIAGNOSIS — E78.5 HYPERLIPIDEMIA, UNSPECIFIED HYPERLIPIDEMIA TYPE: ICD-10-CM

## 2024-07-30 DIAGNOSIS — J44.9 CHRONIC OBSTRUCTIVE PULMONARY DISEASE, UNSPECIFIED COPD TYPE: ICD-10-CM

## 2024-07-30 DIAGNOSIS — I25.5 ISCHEMIC CARDIOMYOPATHY: ICD-10-CM

## 2024-07-30 DIAGNOSIS — N18.30 STAGE 3 CHRONIC KIDNEY DISEASE, UNSPECIFIED WHETHER STAGE 3A OR 3B CKD: ICD-10-CM

## 2024-07-30 DIAGNOSIS — I25.10 CAD, MULTIPLE VESSEL: Primary | ICD-10-CM

## 2024-07-30 PROCEDURE — 3074F SYST BP LT 130 MM HG: CPT | Performed by: NURSE PRACTITIONER

## 2024-07-30 PROCEDURE — 99214 OFFICE O/P EST MOD 30 MIN: CPT | Performed by: NURSE PRACTITIONER

## 2024-07-30 PROCEDURE — 1160F RVW MEDS BY RX/DR IN RCRD: CPT | Performed by: NURSE PRACTITIONER

## 2024-07-30 PROCEDURE — 3078F DIAST BP <80 MM HG: CPT | Performed by: NURSE PRACTITIONER

## 2024-07-30 PROCEDURE — 93000 ELECTROCARDIOGRAM COMPLETE: CPT | Performed by: NURSE PRACTITIONER

## 2024-07-30 PROCEDURE — 1159F MED LIST DOCD IN RCRD: CPT | Performed by: NURSE PRACTITIONER

## 2024-07-30 NOTE — PROGRESS NOTES
Subjective     Florencio Cintron is a 63 y.o. male.   Chief Complaint   Patient presents with    Surgical Clearance     HERNIA REPAIR 8/7/24      History of Present Illness   Florencio Cintron is a 63-year-old male who presents to clinic today for cardiology follow-up    Ischemic cardiomyopathy and heart failure with recovered ejection fraction currently on Entresto and Coreg. He reports BP has been soft recently and adjustments were made in his medications however he is unsure of dosing.  Kidney function has been stable (he is following with nephrology). He reports breathing has been stable, denies worsening dyspnea, orthopnea or abdominal or LE swelling.  Recent echo with recovered LVEF of 56 to 60%.       Multivessel CAD.  He remains on aspirin and Plavix and denies bleeding issues.  He was initially referred to cardiothoracic surgeon Dr. Odonnell who recommended patient see Dr. Del Rio at  due to high risk.  Dr. Del Rio felt he was high risk as well and referred to interventional cardiology for considerations of repeat cardiac cath and possible PCI.  Successful intravascular lithotripsy assisted PCI to severe calcific 90% mid RCA stenosis with placement of a 3.0 x 30 mm Frank frontier drug-eluting stent, postdilated in the proximal to mid segments to 3.5 mm, severe ostial left circumflex disease not intervened upon and medical management was recommended.  He reports doing well status post cath and feels his symptoms are improved.  He denies chest pain and feels his breathing is stable.  He reports compliance with medications.  He remains on aspirin, Imdur, Plavix, Coreg and Lipitor. No recent labs available for review.      COPD following with pulmonology. Moderate pulmonary hypertension noted on recent echo likely related to COPD.  Discussed the importance of following with pulmonology and adherence to medical therapy.  Former smoker.    Patient Active Problem List   Diagnosis    Ischemic cardiomyopathy    Heart failure  with reduced ejection fraction    Coronary artery disease involving native coronary artery of native heart with angina pectoris    Presence of external cardiac defibrillator    Primary hypertension    Chronic obstructive pulmonary disease    Acute non-ST-segment elevation myocardial infarction    Kidney disease    On home O2    Oxygen deficiency    Syncope and collapse    Left inguinal hernia     Past Medical History:   Diagnosis Date    Chronic kidney disease     COPD (chronic obstructive pulmonary disease)     Hypertension     Myocardial infarction     Stroke      Past Surgical History:   Procedure Laterality Date    APPENDECTOMY      HEMORRHOIDECTOMY      TONSILLECTOMY       Family History   Problem Relation Age of Onset    Heart disease Mother     Arthritis Brother      Social History     Tobacco Use    Smoking status: Former     Current packs/day: 0.00     Average packs/day: 2.0 packs/day for 45.0 years (90.0 ttl pk-yrs)     Types: Cigarettes     Start date: 1975     Quit date: 2020     Years since quittin.3     Passive exposure: Past    Smokeless tobacco: Never   Vaping Use    Vaping status: Never Used   Substance Use Topics    Alcohol use: Not Currently     Comment: qnce a week    Drug use: Not Currently     The following portions of the patient's history were reviewed and updated as appropriate: allergies, current medications, past family history, past medical history, past social history, past surgical history and problem list.    No Known Allergies      Current Outpatient Medications:     Aspirin Low Dose 81 MG EC tablet, Take 1 tablet by mouth Daily., Disp: , Rfl:     atorvastatin (LIPITOR) 40 MG tablet, Take 2 tablets by mouth Daily. for cholesterol, Disp: 90 tablet, Rfl: 0    Budeson-Glycopyrrol-Formoterol (Breztri Aerosphere) 160-9-4.8 MCG/ACT aerosol inhaler, Inhale 2 puffs 2 (Two) Times a Day., Disp: 10.7 g, Rfl: 5    carvedilol (COREG) 3.125 MG tablet, Take 8 tablets by mouth 2 (Two)  Times a Day With Meals for 30 days., Disp: 60 tablet, Rfl: 0    clopidogrel (Plavix) 75 MG tablet, Take 1 tablet by mouth Daily., Disp: 30 tablet, Rfl: 0    Earwax Removal 6.5 % otic solution, INSTILL 4 DROPS IN AFFECTED EAR(S) TWO TIMES A DAY FOR 5 DAYS, Disp: , Rfl:     isosorbide mononitrate (IMDUR) 30 MG 24 hr tablet, Take 1 tablet by mouth Daily., Disp: 90 tablet, Rfl: 0    Kerendia 10 MG tablet, Take 1 tablet by mouth Daily., Disp: , Rfl:     sacubitril-valsartan (Entresto) 49-51 MG tablet, Take 1 tablet by mouth 2 (Two) Times a Day., Disp: 60 tablet, Rfl: 0    tamsulosin (FLOMAX) 0.4 MG capsule 24 hr capsule, TAKE ONE CAPSULE BY MOUTH DAILY FOR PROSTATE, Disp: , Rfl:     Ventolin  (90 Base) MCG/ACT inhaler, Inhale 2 puffs Every 4 (Four) Hours As Needed for Wheezing., Disp: 18 g, Rfl: 5    Review of Systems   Constitutional:  Negative for activity change, appetite change, chills, diaphoresis, fatigue and fever.   HENT:  Negative for congestion, drooling, ear discharge, ear pain, mouth sores, nosebleeds, postnasal drip, rhinorrhea, sinus pressure, sneezing and sore throat.    Eyes:  Negative for pain, discharge and visual disturbance.   Respiratory:  Negative for cough, chest tightness, shortness of breath and wheezing.    Cardiovascular:  Negative for chest pain, palpitations and leg swelling.   Gastrointestinal:  Negative for abdominal pain, constipation, diarrhea, nausea and vomiting.   Endocrine: Negative for cold intolerance, heat intolerance, polydipsia, polyphagia and polyuria.   Genitourinary:         Inguinal hernia    Musculoskeletal:  Negative for arthralgias, myalgias and neck pain.   Skin:  Negative for rash and wound.   Neurological:  Negative for dizziness, syncope, speech difficulty, weakness, light-headedness and headaches.   Hematological:  Negative for adenopathy. Does not bruise/bleed easily.   Psychiatric/Behavioral:  Negative for confusion, dysphoric mood and sleep disturbance. The  "patient is not nervous/anxious.    All other systems reviewed and are negative.    /78 (BP Location: Left arm, Patient Position: Sitting, Cuff Size: Adult)   Pulse 91   Ht 167.6 cm (65.98\")   Wt 65.7 kg (144 lb 12.8 oz)   SpO2 97%   BMI 23.39 kg/m²     Objective   No Known Allergies    Physical Exam  Vitals reviewed.   Constitutional:       Appearance: Normal appearance. He is well-developed.   HENT:      Head: Normocephalic.   Eyes:      Conjunctiva/sclera: Conjunctivae normal.   Neck:      Thyroid: No thyromegaly.      Vascular: No carotid bruit or JVD.   Cardiovascular:      Rate and Rhythm: Normal rate and regular rhythm.   Pulmonary:      Effort: Pulmonary effort is normal.      Breath sounds: Normal breath sounds.   Musculoskeletal:      Cervical back: Neck supple.      Right lower leg: No edema.      Left lower leg: No edema.   Skin:     General: Skin is warm and dry.   Neurological:      Mental Status: He is alert and oriented to person, place, and time.   Psychiatric:         Attention and Perception: Attention normal.         Mood and Affect: Mood normal.         Speech: Speech normal.         Behavior: Behavior normal. Behavior is cooperative.         Cognition and Memory: Cognition normal.           ECG 12 Lead    Date/Time: 7/30/2024 4:35 PM  Performed by: Elinor Walls APRN    Authorized by: Elinor Walls APRN  Comparison: compared with previous ECG   Similar to previous ECG  Rhythm: sinus rhythm  Rate: normal  BPM: 89  Other findings: non-specific ST-T wave changes and T wave abnormality    Clinical impression: abnormal EKG  Comments: QT/QTc - 345/391          LABS  WBC   Date Value Ref Range Status   07/08/2024 8.58 3.40 - 10.80 10*3/mm3 Final   01/11/2024 6.97 3.70 - 10.30 10*3/uL Final     RBC   Date Value Ref Range Status   07/08/2024 4.18 4.14 - 5.80 10*6/mm3 Final   01/11/2024 4.14 (L) 4.60 - 6.10 10*6/uL Final     Hemoglobin   Date Value Ref Range Status   07/08/2024 12.4 " (L) 13.0 - 17.7 g/dL Final   01/11/2024 11.8 (L) 13.7 - 17.5 g/dL Final     Hematocrit   Date Value Ref Range Status   07/08/2024 37.9 37.5 - 51.0 % Final   01/11/2024 36.6 (L) 40.0 - 51.0 % Final     MCV   Date Value Ref Range Status   07/08/2024 90.7 79.0 - 97.0 fL Final   01/11/2024 88 79 - 98 fL Final     MCH   Date Value Ref Range Status   07/08/2024 29.7 26.6 - 33.0 pg Final   01/11/2024 28.5 26.0 - 32.0 pg Final     MCHC   Date Value Ref Range Status   07/08/2024 32.7 31.5 - 35.7 g/dL Final   01/11/2024 32.2 30.7 - 35.5 g/dL Final     RDW   Date Value Ref Range Status   07/08/2024 13.6 12.3 - 15.4 % Final   01/11/2024 13.4 11.5 - 14.5 % Final     RDW-SD   Date Value Ref Range Status   07/08/2024 44.6 37.0 - 54.0 fl Final     MPV   Date Value Ref Range Status   07/08/2024 8.2 6.0 - 12.0 fL Final   01/11/2024 8.4 (L) 8.8 - 12.5 fL Final     Platelets   Date Value Ref Range Status   07/08/2024 322 140 - 450 10*3/mm3 Final   01/11/2024 214 155 - 369 10*3/uL Final     Neutrophil %   Date Value Ref Range Status   07/08/2024 65.2 42.7 - 76.0 % Final     Lymphocyte %   Date Value Ref Range Status   07/08/2024 21.4 19.6 - 45.3 % Final     Monocyte %   Date Value Ref Range Status   07/08/2024 8.5 5.0 - 12.0 % Final     Eosinophil %   Date Value Ref Range Status   07/08/2024 4.1 0.3 - 6.2 % Final     Basophil %   Date Value Ref Range Status   07/08/2024 0.6 0.0 - 1.5 % Final     Immature Grans %   Date Value Ref Range Status   07/08/2024 0.2 0.0 - 0.5 % Final     Neutrophils, Absolute   Date Value Ref Range Status   07/08/2024 5.59 1.70 - 7.00 10*3/mm3 Final     Lymphocytes, Absolute   Date Value Ref Range Status   07/08/2024 1.84 0.70 - 3.10 10*3/mm3 Final     Monocytes, Absolute   Date Value Ref Range Status   07/08/2024 0.73 0.10 - 0.90 10*3/mm3 Final     Eosinophils, Absolute   Date Value Ref Range Status   07/08/2024 0.35 0.00 - 0.40 10*3/mm3 Final     Basophils, Absolute   Date Value Ref Range Status   07/08/2024  0.05 0.00 - 0.20 10*3/mm3 Final     Immature Grans, Absolute   Date Value Ref Range Status   07/08/2024 0.02 0.00 - 0.05 10*3/mm3 Final     nRBC   Date Value Ref Range Status   07/08/2024 0.0 0.0 - 0.2 /100 WBC Final       Total Cholesterol   Date Value Ref Range Status   11/16/2023 144 0 - 200 mg/dL Final     Triglycerides   Date Value Ref Range Status   11/16/2023 52 0 - 150 mg/dL Final     HDL Cholesterol   Date Value Ref Range Status   11/16/2023 47 40 - 60 mg/dL Final     LDL Cholesterol    Date Value Ref Range Status   11/16/2023 86 0 - 100 mg/dL Final       Assessment & Plan   Diagnoses and all orders for this visit:    1. CAD, multiple vessel (Primary)  -     Comprehensive Metabolic Panel; Future  -     Lipid Panel; Future  -     CK; Future  -     BNP; Future  -     ECG 12 Lead  EKG reviewed and discussed, nonspecific changes noted which appear to be stable  Clinically asymptomatic  Continue on aspirin and Plavix.  Patient had stents in January 2024, it is recommended he remain on DAPT without interruption for 1 year unless emergent surgery is required.  Recommend waiting 6 months with reevaluation for surgery however due to underlying extensive coronary history and multiple other risk factors he would be a high surgery risk.  Continue aspirin, statin, Imdur and beta-blocker    2. Ischemic cardiomyopathy  With recovered LVEF, patient remains clinically asymptomatic.  Will continue on Entresto and Coreg.    3. Chronic heart failure with preserved ejection fraction  Advance guideline dose medical therapy as tolerated, continue Entresto and Coreg  Sodium restrictions recommend    4. Primary hypertension  Initially elevated in the clinic but recheck was improved, will continue to closely monitor    5. Stage 3 chronic kidney disease, unspecified whether stage 3a or 3b CKD  Recent labs with stable/improved kidney function, continue management with nephrology    6. Chronic obstructive pulmonary disease, unspecified  COPD type  Continue management by pulmonary    7. Hyperlipidemia, unspecified hyperlipidemia type  Continue statin, routine labs recommended    Review of medical record lifestyle modifications including heart healthy diet, regular exercise, maintenance of desirable body weight and avoidance of tobacco products     Follow-up in 3 months, sooner if needed

## 2024-07-30 NOTE — LETTER
July 30, 2024     OLIVIA Christopher  [address]  [address]  [facility] [address]    Patient: Florencio Cintron   YOB: 1960   Date of Visit: 7/30/2024       Dear OLIVIA Christopher    Florencio Cintron was in my office today. Below is a copy of my note.    If you have questions, please do not hesitate to call me. I look forward to following Florencio along with you.         Sincerely,        OLIVIA Barone        CC: Radha Arellano MD    Subjective    Florencio Cintron is a 63 y.o. male.   Chief Complaint   Patient presents with   • Surgical Clearance     HERNIA REPAIR 8/7/24      History of Present Illness   Florencio Cintron is a 63-year-old male who presents to clinic today for cardiology follow-up    Ischemic cardiomyopathy and heart failure with recovered ejection fraction currently on Entresto and Coreg. He reports BP has been soft recently and adjustments were made in his medications however he is unsure of dosing.  Kidney function has been stable (he is following with nephrology). He reports breathing has been stable, denies worsening dyspnea, orthopnea or abdominal or LE swelling.  Recent echo with recovered LVEF of 56 to 60%.       Multivessel CAD.  He remains on aspirin and Plavix and denies bleeding issues.  He was initially referred to cardiothoracic surgeon Dr. Odonnell who recommended patient see Dr. Del Rio at  due to high risk.  Dr. Del Rio felt he was high risk as well and referred to interventional cardiology for considerations of repeat cardiac cath and possible PCI.  Successful intravascular lithotripsy assisted PCI to severe calcific 90% mid RCA stenosis with placement of a 3.0 x 30 mm [place] frontier drug-eluting stent, postdilated in the proximal to mid segments to 3.5 mm, severe ostial left circumflex disease not intervened upon and medical management was recommended.  He reports doing well status post cath and feels his symptoms are improved.  He denies chest pain and feels his  breathing is stable.  He reports compliance with medications.  He remains on aspirin, Imdur, Plavix, Coreg and Lipitor. No recent labs available for review.      COPD following with pulmonology. Moderate pulmonary hypertension noted on recent echo likely related to COPD.  Discussed the importance of following with pulmonology and adherence to medical therapy.  Former smoker.    Patient Active Problem List   Diagnosis   • Ischemic cardiomyopathy   • Heart failure with reduced ejection fraction   • Coronary artery disease involving native coronary artery of native heart with angina pectoris   • Presence of external cardiac defibrillator   • Primary hypertension   • Chronic obstructive pulmonary disease   • Acute non-ST-segment elevation myocardial infarction   • Kidney disease   • On home O2   • Oxygen deficiency   • Syncope and collapse   • Left inguinal hernia     Past Medical History:   Diagnosis Date   • Chronic kidney disease    • COPD (chronic obstructive pulmonary disease)    • Hypertension    • Myocardial infarction    • Stroke      Past Surgical History:   Procedure Laterality Date   • APPENDECTOMY     • HEMORRHOIDECTOMY     • TONSILLECTOMY       Family History   Problem Relation Age of Onset   • Heart disease Mother    • Arthritis Brother      Social History     Tobacco Use   • Smoking status: Former     Current packs/day: 0.00     Average packs/day: 2.0 packs/day for 45.0 years (90.0 ttl pk-yrs)     Types: Cigarettes     Start date: 1975     Quit date: 2020     Years since quittin.3     Passive exposure: Past   • Smokeless tobacco: Never   Vaping Use   • Vaping status: Never Used   Substance Use Topics   • Alcohol use: Not Currently     Comment: qnce a week   • Drug use: Not Currently     The following portions of the patient's history were reviewed and updated as appropriate: allergies, current medications, past family history, past medical history, past social history, past surgical history  and problem list.    No Known Allergies      Current Outpatient Medications:   •  Aspirin Low Dose 81 MG EC tablet, Take 1 tablet by mouth Daily., Disp: , Rfl:   •  atorvastatin (LIPITOR) 40 MG tablet, Take 2 tablets by mouth Daily. for cholesterol, Disp: 90 tablet, Rfl: 0  •  Budeson-Glycopyrrol-Formoterol (Breztri Aerosphere) 160-9-4.8 MCG/ACT aerosol inhaler, Inhale 2 puffs 2 (Two) Times a Day., Disp: 10.7 g, Rfl: 5  •  carvedilol (COREG) 3.125 MG tablet, Take 8 tablets by mouth 2 (Two) Times a Day With Meals for 30 days., Disp: 60 tablet, Rfl: 0  •  clopidogrel (Plavix) 75 MG tablet, Take 1 tablet by mouth Daily., Disp: 30 tablet, Rfl: 0  •  Earwax Removal 6.5 % otic solution, INSTILL 4 DROPS IN AFFECTED EAR(S) TWO TIMES A DAY FOR 5 DAYS, Disp: , Rfl:   •  isosorbide mononitrate (IMDUR) 30 MG 24 hr tablet, Take 1 tablet by mouth Daily., Disp: 90 tablet, Rfl: 0  •  Kerendia 10 MG tablet, Take 1 tablet by mouth Daily., Disp: , Rfl:   •  sacubitril-valsartan (Entresto) 49-51 MG tablet, Take 1 tablet by mouth 2 (Two) Times a Day., Disp: 60 tablet, Rfl: 0  •  tamsulosin (FLOMAX) 0.4 MG capsule 24 hr capsule, TAKE ONE CAPSULE BY MOUTH DAILY FOR PROSTATE, Disp: , Rfl:   •  Ventolin  (90 Base) MCG/ACT inhaler, Inhale 2 puffs Every 4 (Four) Hours As Needed for Wheezing., Disp: 18 g, Rfl: 5    Review of Systems   Constitutional:  Negative for activity change, appetite change, chills, diaphoresis, fatigue and fever.   HENT:  Negative for congestion, drooling, ear discharge, ear pain, mouth sores, nosebleeds, postnasal drip, rhinorrhea, sinus pressure, sneezing and sore throat.    Eyes:  Negative for pain, discharge and visual disturbance.   Respiratory:  Negative for cough, chest tightness, shortness of breath and wheezing.    Cardiovascular:  Negative for chest pain, palpitations and leg swelling.   Gastrointestinal:  Negative for abdominal pain, constipation, diarrhea, nausea and vomiting.   Endocrine: Negative  "for cold intolerance, heat intolerance, polydipsia, polyphagia and polyuria.   Genitourinary:         Inguinal hernia    Musculoskeletal:  Negative for arthralgias, myalgias and neck pain.   Skin:  Negative for rash and wound.   Neurological:  Negative for dizziness, syncope, speech difficulty, weakness, light-headedness and headaches.   Hematological:  Negative for adenopathy. Does not bruise/bleed easily.   Psychiatric/Behavioral:  Negative for confusion, dysphoric mood and sleep disturbance. The patient is not nervous/anxious.    All other systems reviewed and are negative.    /78 (BP Location: Left arm, Patient Position: Sitting, Cuff Size: Adult)   Pulse 91   Ht 167.6 cm (65.98\")   Wt 65.7 kg (144 lb 12.8 oz)   SpO2 97%   BMI 23.39 kg/m²     Objective  No Known Allergies    Physical Exam  Vitals reviewed.   Constitutional:       Appearance: Normal appearance. He is well-developed.   HENT:      Head: Normocephalic.   Eyes:      Conjunctiva/sclera: Conjunctivae normal.   Neck:      Thyroid: No thyromegaly.      Vascular: No carotid bruit or JVD.   Cardiovascular:      Rate and Rhythm: Normal rate and regular rhythm.   Pulmonary:      Effort: Pulmonary effort is normal.      Breath sounds: Normal breath sounds.   Musculoskeletal:      Cervical back: Neck supple.      Right lower leg: No edema.      Left lower leg: No edema.   Skin:     General: Skin is warm and dry.   Neurological:      Mental Status: He is alert and oriented to person, place, and time.   Psychiatric:         Attention and Perception: Attention normal.         Mood and Affect: Mood normal.         Speech: Speech normal.         Behavior: Behavior normal. Behavior is cooperative.         Cognition and Memory: Cognition normal.           ECG 12 Lead    Date/Time: 7/30/2024 4:35 PM  Performed by: Elinor Walls APRN    Authorized by: Elinor Walls APRN  Comparison: compared with previous ECG   Similar to previous ECG  Rhythm: " sinus rhythm  Rate: normal  BPM: 89  Other findings: non-specific ST-T wave changes and T wave abnormality    Clinical impression: abnormal EKG  Comments: QT/QTc - 345/391          LABS  WBC   Date Value Ref Range Status   07/08/2024 8.58 3.40 - 10.80 10*3/mm3 Final   01/11/2024 6.97 3.70 - 10.30 10*3/uL Final     RBC   Date Value Ref Range Status   07/08/2024 4.18 4.14 - 5.80 10*6/mm3 Final   01/11/2024 4.14 (L) 4.60 - 6.10 10*6/uL Final     Hemoglobin   Date Value Ref Range Status   07/08/2024 12.4 (L) 13.0 - 17.7 g/dL Final   01/11/2024 11.8 (L) 13.7 - 17.5 g/dL Final     Hematocrit   Date Value Ref Range Status   07/08/2024 37.9 37.5 - 51.0 % Final   01/11/2024 36.6 (L) 40.0 - 51.0 % Final     MCV   Date Value Ref Range Status   07/08/2024 90.7 79.0 - 97.0 fL Final   01/11/2024 88 79 - 98 fL Final     MCH   Date Value Ref Range Status   07/08/2024 29.7 26.6 - 33.0 pg Final   01/11/2024 28.5 26.0 - 32.0 pg Final     MCHC   Date Value Ref Range Status   07/08/2024 32.7 31.5 - 35.7 g/dL Final   01/11/2024 32.2 30.7 - 35.5 g/dL Final     RDW   Date Value Ref Range Status   07/08/2024 13.6 12.3 - 15.4 % Final   01/11/2024 13.4 11.5 - 14.5 % Final     RDW-SD   Date Value Ref Range Status   07/08/2024 44.6 37.0 - 54.0 fl Final     MPV   Date Value Ref Range Status   07/08/2024 8.2 6.0 - 12.0 fL Final   01/11/2024 8.4 (L) 8.8 - 12.5 fL Final     Platelets   Date Value Ref Range Status   07/08/2024 322 140 - 450 10*3/mm3 Final   01/11/2024 214 155 - 369 10*3/uL Final     Neutrophil %   Date Value Ref Range Status   07/08/2024 65.2 42.7 - 76.0 % Final     Lymphocyte %   Date Value Ref Range Status   07/08/2024 21.4 19.6 - 45.3 % Final     Monocyte %   Date Value Ref Range Status   07/08/2024 8.5 5.0 - 12.0 % Final     Eosinophil %   Date Value Ref Range Status   07/08/2024 4.1 0.3 - 6.2 % Final     Basophil %   Date Value Ref Range Status   07/08/2024 0.6 0.0 - 1.5 % Final     Immature Grans %   Date Value Ref Range  Status   07/08/2024 0.2 0.0 - 0.5 % Final     Neutrophils, Absolute   Date Value Ref Range Status   07/08/2024 5.59 1.70 - 7.00 10*3/mm3 Final     Lymphocytes, Absolute   Date Value Ref Range Status   07/08/2024 1.84 0.70 - 3.10 10*3/mm3 Final     Monocytes, Absolute   Date Value Ref Range Status   07/08/2024 0.73 0.10 - 0.90 10*3/mm3 Final     Eosinophils, Absolute   Date Value Ref Range Status   07/08/2024 0.35 0.00 - 0.40 10*3/mm3 Final     Basophils, Absolute   Date Value Ref Range Status   07/08/2024 0.05 0.00 - 0.20 10*3/mm3 Final     Immature Grans, Absolute   Date Value Ref Range Status   07/08/2024 0.02 0.00 - 0.05 10*3/mm3 Final     nRBC   Date Value Ref Range Status   07/08/2024 0.0 0.0 - 0.2 /100 WBC Final       Total Cholesterol   Date Value Ref Range Status   11/16/2023 144 0 - 200 mg/dL Final     Triglycerides   Date Value Ref Range Status   11/16/2023 52 0 - 150 mg/dL Final     HDL Cholesterol   Date Value Ref Range Status   11/16/2023 47 40 - 60 mg/dL Final     LDL Cholesterol    Date Value Ref Range Status   11/16/2023 86 0 - 100 mg/dL Final       Assessment & Plan  Diagnoses and all orders for this visit:    1. CAD, multiple vessel (Primary)  -     Comprehensive Metabolic Panel; Future  -     Lipid Panel; Future  -     CK; Future  -     BNP; Future  -     ECG 12 Lead  EKG reviewed and discussed, nonspecific changes noted which appear to be stable  Clinically asymptomatic  Continue on aspirin and Plavix.  Patient had stents in January 2024, it is recommended he remain on DAPT without interruption for 1 year unless emergent surgery is required.  Recommend waiting 6 months with reevaluation for surgery however due to underlying extensive coronary history and multiple other risk factors he would be a high surgery risk.  Continue aspirin, statin, Imdur and beta-blocker    2. Ischemic cardiomyopathy  With recovered LVEF, patient remains clinically asymptomatic.  Will continue on Entresto and  Coreg.    3. Chronic heart failure with preserved ejection fraction  Advance guideline dose medical therapy as tolerated, continue Entresto and Coreg  Sodium restrictions recommend    4. Primary hypertension  Initially elevated in the clinic but recheck was improved, will continue to closely monitor    5. Stage 3 chronic kidney disease, unspecified whether stage 3a or 3b CKD  Recent labs with stable/improved kidney function, continue management with nephrology    6. Chronic obstructive pulmonary disease, unspecified COPD type  Continue management by pulmonary    7. Hyperlipidemia, unspecified hyperlipidemia type  Continue statin, routine labs recommended    Review of medical record lifestyle modifications including heart healthy diet, regular exercise, maintenance of desirable body weight and avoidance of tobacco products     Follow-up in 3 months, sooner if needed

## 2024-08-06 ENCOUNTER — OFFICE VISIT (OUTPATIENT)
Age: 64
End: 2024-08-06
Payer: MEDICAID

## 2024-08-06 VITALS — HEIGHT: 66 IN | WEIGHT: 150 LBS | BODY MASS INDEX: 24.11 KG/M2

## 2024-08-06 DIAGNOSIS — K40.90 LEFT INGUINAL HERNIA: Primary | ICD-10-CM

## 2024-08-06 PROCEDURE — 1160F RVW MEDS BY RX/DR IN RCRD: CPT | Performed by: SURGERY

## 2024-08-06 PROCEDURE — 99214 OFFICE O/P EST MOD 30 MIN: CPT | Performed by: SURGERY

## 2024-08-06 PROCEDURE — 1159F MED LIST DOCD IN RCRD: CPT | Performed by: SURGERY

## 2024-08-07 NOTE — PROGRESS NOTES
"Patient Name:  Florencio Cintron  YOB: 1960  [id number]    Follow Up Note    Date of visit: 8/7/2024    Subjective   Subjective: Mr. Cintron presents today for follow-up after evaluation by cardiology.  He was found to be a high risk surgical candidate and it was recommended that he remain on his aspirin and Plavix uninterrupted for a full duration of 1 year.         Objective     Objective:     Ht 167.6 cm (65.98\")   Wt 68 kg (150 lb)   BMI 24.22 kg/m²       CV:  Regular rate and rhythm  L:  Bilateral lung rise and fall, no use of accessory muscles   Abd:  Soft, nontender, nondistended  Ext:  No cyanosis, clubbing, edema      Assessment/ Plan:  Assessment   Diagnoses and all orders for this visit:    1. Left inguinal hernia (Primary)    Mr. Cintron is a 63-year-old male with a symptomatic left inguinal hernia.  He was initially scheduled for surgery tomorrow however after review of the cardiology notes, it was found that he was found to be a high risk surgical candidate and it was recommended that he remain on aspirin and Plavix uninterrupted for 1 year.  Extensive discussion was had today in clinic.  He elected to defer surgical intervention on his left side inguinal hernia to try to wait until January in order to complete 1 full year post cardiac stenting.  Strict return precautions to the emergency department where the discussed with the patient and his family member including severe pain in his groin, nausea, vomiting, inability to have a bowel movement, or redness or significant swelling over his hernia site.  They expressed understanding and will notify my clinic should his symptoms worsen over time.    Radha Barnes MD       General Surgeon  [facility]       "

## 2024-08-14 ENCOUNTER — TELEPHONE (OUTPATIENT)
Dept: CARDIOLOGY | Facility: CLINIC | Age: 64
End: 2024-08-14
Payer: MEDICAID

## 2024-08-14 RX ORDER — ISOSORBIDE MONONITRATE 30 MG/1
30 TABLET, EXTENDED RELEASE ORAL DAILY
Qty: 90 TABLET | Refills: 0 | Status: SHIPPED | OUTPATIENT
Start: 2024-08-14

## 2024-09-03 ENCOUNTER — LAB (OUTPATIENT)
Dept: LAB | Facility: HOSPITAL | Age: 64
End: 2024-09-03
Payer: MEDICAID

## 2024-09-03 ENCOUNTER — TRANSCRIBE ORDERS (OUTPATIENT)
Dept: ADMINISTRATIVE | Facility: HOSPITAL | Age: 64
End: 2024-09-03
Payer: MEDICAID

## 2024-09-03 DIAGNOSIS — N18.32 CHRONIC KIDNEY DISEASE (CKD) STAGE G3B/A1, MODERATELY DECREASED GLOMERULAR FILTRATION RATE (GFR) BETWEEN 30-44 ML/MIN/1.73 SQUARE METER AND ALBUMINURIA CREATININE RATIO LESS THAN 30 MG/G (CMS/H*: Primary | ICD-10-CM

## 2024-09-03 DIAGNOSIS — I25.10 CAD, MULTIPLE VESSEL: ICD-10-CM

## 2024-09-03 DIAGNOSIS — N18.32 CHRONIC KIDNEY DISEASE (CKD) STAGE G3B/A1, MODERATELY DECREASED GLOMERULAR FILTRATION RATE (GFR) BETWEEN 30-44 ML/MIN/1.73 SQUARE METER AND ALBUMINURIA CREATININE RATIO LESS THAN 30 MG/G (CMS/H*: ICD-10-CM

## 2024-09-03 LAB
ALBUMIN SERPL-MCNC: 3.7 G/DL (ref 3.5–5.2)
ALBUMIN UR-MCNC: 53.9 MG/DL
ALBUMIN/GLOB SERPL: 1.5 G/DL
ALP SERPL-CCNC: 85 U/L (ref 39–117)
ALT SERPL W P-5'-P-CCNC: 8 U/L (ref 1–41)
ANION GAP SERPL CALCULATED.3IONS-SCNC: 8 MMOL/L (ref 5–15)
AST SERPL-CCNC: 9 U/L (ref 1–40)
BACTERIA UR QL AUTO: ABNORMAL /HPF
BILIRUB SERPL-MCNC: 0.3 MG/DL (ref 0–1.2)
BILIRUB UR QL STRIP: NEGATIVE
BUN SERPL-MCNC: 30 MG/DL (ref 8–23)
BUN/CREAT SERPL: 23.3 (ref 7–25)
CALCIUM SPEC-SCNC: 9.4 MG/DL (ref 8.6–10.5)
CHLORIDE SERPL-SCNC: 104 MMOL/L (ref 98–107)
CHOLEST SERPL-MCNC: 125 MG/DL (ref 0–200)
CK SERPL-CCNC: 57 U/L (ref 20–200)
CLARITY UR: CLEAR
CO2 SERPL-SCNC: 24 MMOL/L (ref 22–29)
COLOR UR: YELLOW
CREAT SERPL-MCNC: 1.29 MG/DL (ref 0.76–1.27)
CREAT UR-MCNC: 94.5 MG/DL
CREAT UR-MCNC: 94.5 MG/DL
EGFRCR SERPLBLD CKD-EPI 2021: 62.7 ML/MIN/1.73
GLOBULIN UR ELPH-MCNC: 2.5 GM/DL
GLUCOSE SERPL-MCNC: 92 MG/DL (ref 65–99)
GLUCOSE UR STRIP-MCNC: NEGATIVE MG/DL
HDLC SERPL-MCNC: 46 MG/DL (ref 40–60)
HGB UR QL STRIP.AUTO: NEGATIVE
HOLD SPECIMEN: NORMAL
HYALINE CASTS UR QL AUTO: ABNORMAL /LPF
KETONES UR QL STRIP: NEGATIVE
LDLC SERPL CALC-MCNC: 66 MG/DL (ref 0–100)
LDLC/HDLC SERPL: 1.44 {RATIO}
LEUKOCYTE ESTERASE UR QL STRIP.AUTO: NEGATIVE
MICROALBUMIN/CREAT UR: 570.4 MG/G (ref 0–29)
NITRITE UR QL STRIP: NEGATIVE
NT-PROBNP SERPL-MCNC: 213 PG/ML (ref 0–900)
PH UR STRIP.AUTO: 6 [PH] (ref 5–8)
POTASSIUM SERPL-SCNC: 4.1 MMOL/L (ref 3.5–5.2)
PROT ?TM UR-MCNC: 82.2 MG/DL
PROT SERPL-MCNC: 6.2 G/DL (ref 6–8.5)
PROT UR QL STRIP: ABNORMAL
PROT/CREAT UR: 869.8 MG/G CREA (ref 0–200)
RBC # UR STRIP: ABNORMAL /HPF
REF LAB TEST METHOD: ABNORMAL
SODIUM SERPL-SCNC: 136 MMOL/L (ref 136–145)
SP GR UR STRIP: 1.02 (ref 1–1.03)
SQUAMOUS #/AREA URNS HPF: ABNORMAL /HPF
TRIGL SERPL-MCNC: 63 MG/DL (ref 0–150)
UROBILINOGEN UR QL STRIP: ABNORMAL
VLDLC SERPL-MCNC: 13 MG/DL (ref 5–40)
WBC # UR STRIP: ABNORMAL /HPF

## 2024-09-03 PROCEDURE — 82570 ASSAY OF URINE CREATININE: CPT

## 2024-09-03 PROCEDURE — 82043 UR ALBUMIN QUANTITATIVE: CPT

## 2024-09-03 PROCEDURE — 36415 COLL VENOUS BLD VENIPUNCTURE: CPT

## 2024-09-03 PROCEDURE — 81001 URINALYSIS AUTO W/SCOPE: CPT

## 2024-09-03 PROCEDURE — 83880 ASSAY OF NATRIURETIC PEPTIDE: CPT

## 2024-09-03 PROCEDURE — 82550 ASSAY OF CK (CPK): CPT

## 2024-09-03 PROCEDURE — 84156 ASSAY OF PROTEIN URINE: CPT

## 2024-09-03 PROCEDURE — 80061 LIPID PANEL: CPT

## 2024-09-03 PROCEDURE — 80053 COMPREHEN METABOLIC PANEL: CPT

## 2024-09-05 ENCOUNTER — TELEPHONE (OUTPATIENT)
Dept: CARDIOLOGY | Facility: CLINIC | Age: 64
End: 2024-09-05
Payer: MEDICAID

## 2024-09-05 NOTE — TELEPHONE ENCOUNTER
----- Message from Elinor Walls sent at 9/5/2024  9:06 AM EDT -----  Kidney function is slightly elevated but overall stable, electrolytes are normal, liver enzymes are normal     Ck is normal     Cholesterol is ok, continue on medication     BNP - improved     Continue current therapy and keep follow up as scheduled

## 2024-10-07 ENCOUNTER — HOSPITAL ENCOUNTER (OUTPATIENT)
Dept: CT IMAGING | Facility: HOSPITAL | Age: 64
Discharge: HOME | End: 2024-10-07
Admitting: NURSE PRACTITIONER
Payer: MEDICAID

## 2024-10-07 DIAGNOSIS — Z12.2 ENCOUNTER FOR SCREENING FOR LUNG CANCER: ICD-10-CM

## 2024-10-07 DIAGNOSIS — F17.211 CIGARETTE NICOTINE DEPENDENCE IN REMISSION: ICD-10-CM

## 2024-10-07 PROCEDURE — 71271 CT THORAX LUNG CANCER SCR C-: CPT

## 2024-10-11 ENCOUNTER — TELEPHONE (OUTPATIENT)
Age: 64
End: 2024-10-11

## 2024-10-11 NOTE — TELEPHONE ENCOUNTER
SAME DAY CANCEL    Caller: Landy Hoffmann    Relationship to patient: Emergency Contact    Best call back number: [phone]     Patient is needing: SAME DAY CANCEL - HUB R/S FOR 10.18.24

## 2024-10-24 ENCOUNTER — OFFICE VISIT (OUTPATIENT)
Dept: PULMONOLOGY | Facility: CLINIC | Age: 64
End: 2024-10-24
Payer: MEDICAID

## 2024-10-24 VITALS
SYSTOLIC BLOOD PRESSURE: 138 MMHG | TEMPERATURE: 94.9 F | DIASTOLIC BLOOD PRESSURE: 80 MMHG | BODY MASS INDEX: 23.75 KG/M2 | HEART RATE: 79 BPM | OXYGEN SATURATION: 98 % | WEIGHT: 147.8 LBS | HEIGHT: 66 IN

## 2024-10-24 DIAGNOSIS — R91.8 GROUND GLASS OPACITY PRESENT ON IMAGING OF LUNG: ICD-10-CM

## 2024-10-24 DIAGNOSIS — F17.211 CIGARETTE NICOTINE DEPENDENCE IN REMISSION: ICD-10-CM

## 2024-10-24 DIAGNOSIS — J44.9 CHRONIC OBSTRUCTIVE PULMONARY DISEASE, UNSPECIFIED COPD TYPE: Primary | ICD-10-CM

## 2024-10-24 RX ORDER — BUDESONIDE, GLYCOPYRROLATE, AND FORMOTEROL FUMARATE 160; 9; 4.8 UG/1; UG/1; UG/1
2 AEROSOL, METERED RESPIRATORY (INHALATION) 2 TIMES DAILY
Qty: 10.7 G | Refills: 5 | Status: SHIPPED | OUTPATIENT
Start: 2024-10-24

## 2024-10-24 RX ORDER — ALBUTEROL SULFATE 90 UG/1
2 AEROSOL, METERED RESPIRATORY (INHALATION) EVERY 4 HOURS PRN
Qty: 18 G | Refills: 5 | Status: SHIPPED | OUTPATIENT
Start: 2024-10-24

## 2024-10-24 NOTE — PROGRESS NOTES
"Chief Complaint  Chronic obstructive pulmonary disease, unspecified COPD type    Subjective          Florencio Cintron presents to [facility] for   History of Present Illness    Mr. Cintron is a 64 year old male with a medical history significant for CKD, COPD, CAD, hypertension, MI, and stroke.    He presents today for follow up on COPD.  He states that he has been doing well since his last visit.  He reports that his breathing has been at baseline.  He is currently taking Breztri BID and albuterol as needed.    Objective   Vital Signs:   /80   Pulse 79   Temp 94.9 °F (34.9 °C)   Ht 167.6 cm (65.98\")   Wt 67 kg (147 lb 12.8 oz)   SpO2 98%   BMI 23.87 kg/m²         Physical Exam    GENERAL APPEARANCE: Well developed, well nourished, alert and cooperative, and appears to be in no acute distress.    HEAD: normocephalic. Atraumatic.    EYES: PERRL, EOMI. Vision is grossly intact.    THROAT: Oral cavity and pharynx normal. No inflammation, swelling, exudate, or lesions.     NECK: Neck supple.  No thyromegaly.    CARDIAC: Normal S1 and S2. No S3, S4 or murmurs. Rhythm is regular.     RESPIRATORY:Bilateral air entry positive.  No wheezing, crackles or rhonchi noted.    GI: Positive bowel sounds. Soft, nondistended, nontender.     MUSCULOSKELETAL: No significant deformity or joint abnormality. No edema. Peripheral pulses intact. No varicosities.    NEUROLOGICAL: Strength and sensation symmetric and intact throughout.     PSYCHIATRIC: The mental examination revealed the patient was oriented to person, place, and time.       Estimated body mass index is 23.87 kg/m² as calculated from the following:    Height as of this encounter: 167.6 cm (65.98\").    Weight as of this encounter: 67 kg (147 lb 12.8 oz).        Result Review :   The following data was reviewed by: OLIVIA Hunter on 10/24/2024:  Common labs          1/11/2024    06:50 1/11/2024    " "06:53 7/8/2024    21:20 9/3/2024    09:33   Common Labs   Glucose   107  92    BUN   18  30    Creatinine   1.19  1.29    EGFR  Am 62          Sodium   141  136    Potassium   3.3  4.1    Chloride   108  104    Calcium   9.2  9.4    Albumin   3.3  3.7    Total Bilirubin   0.2  0.3    Alkaline Phosphatase   91  85    AST (SGOT)   14  9    ALT (SGPT)   13  8    WBC  6.97     8.58     Hemoglobin  11.8     12.4     Hematocrit  36.6     37.9     Platelets  214     322     Total Cholesterol    125    Triglycerides    63    HDL Cholesterol    46    LDL Cholesterol     66    Microalbumin, Urine    53.9       Details          This result is from an external source.                  PFT:none    Low dose lung cancer screening:10/7/24    FINDINGS:     LUNGS: 1 cm somewhat ground-glass density right lung image 99 series 2  right lower lobe     HEART: Coronary artery calcifications     MEDIASTINUM: No masses. No enlarged lymph nodes.  No fluid collections.     PLEURA: No pleural effusion. No pleural mass or abnormal calcification.  No pneumothorax.     VASCULATURE: No evidence of aneurysm.     BONES: No acute bony abnormality.     VISUALIZED UPPER ABDOMEN:The upper abdomen is unremarkable as  visualized.     Other: None.     IMPRESSION:     1. 1 cm predominantly ground-glass density in the right lung.  Lung RADS category 3, recommend short interval follow-up CT at 3 to 6  months.                    This report was finalized on 10/8/2024 8:44 AM by Dr. Shawn Pendleton MD.    Previous chest imaging:NA    Alpha-1 antitrypsin screening:NA    STOP-Bang Score:   NA  [place] Sleepiness Scale:   NA      ABG:    pH No results found for: \"PHART\"   pO2 No results found for: \"PO2ART\"   pCO2 No results found for: \"MBA4PWO\"   HCO3 No results found for: \"NVI6BCA\"                      Assessment and Plan    Problem List Items Addressed This Visit          Pulmonary and Pneumonias    Chronic obstructive pulmonary disease - Primary    " Relevant Medications    Budeson-Glycopyrrol-Formoterol (Breztri Aerosphere) 160-9-4.8 MCG/ACT aerosol inhaler    Ventolin  (90 Base) MCG/ACT inhaler     Other Visit Diagnoses       Cigarette nicotine dependence in remission        Ground glass opacity present on imaging of lung        Relevant Orders    CT Chest Without Contrast            Florencio Cintron  reports that he quit smoking about 4 years ago. His smoking use included cigarettes. He started smoking about 49 years ago. He has a 90 pack-year smoking history. He has been exposed to tobacco smoke. He has never used smokeless tobacco.       Will obtain spirometry at next visit.  Continue Breztri BID.  Continue albuterol as needed.    Sent in refills.    He was given influenza vaccine in office.    Low dose CT chest was reviewed.  1 cm ground glass density noted in the right lung.  We will repeat CT chest in 3 months.    Follow Up   Return in about 3 months (around 1/24/2025).  Patient was given instructions and counseling regarding his condition or for health maintenance advice. Please see specific information pulled into the AVS if appropriate.

## 2024-10-25 ENCOUNTER — OFFICE VISIT (OUTPATIENT)
Age: 64
End: 2024-10-25
Payer: MEDICAID

## 2024-10-25 VITALS — WEIGHT: 147 LBS | BODY MASS INDEX: 23.63 KG/M2 | HEIGHT: 66 IN

## 2024-10-25 DIAGNOSIS — K40.91 RECURRENT INGUINAL HERNIA OF RIGHT SIDE WITHOUT OBSTRUCTION OR GANGRENE: Primary | ICD-10-CM

## 2024-10-25 DIAGNOSIS — K40.90 LEFT INGUINAL HERNIA: ICD-10-CM

## 2024-10-25 NOTE — PROGRESS NOTES
"Patient Name:  Florencio Cintron  YOB: 1960  [id number]    Follow Up Note    Date of visit: 10/25/2024    Subjective   Subjective: Presents for follow-up.  He reports complaints of a new right sided hernia.  We are still in the waiting period for his post cardiac procedure, he knows that we will plan for surgical intervention in January.         Objective     Objective:     Ht 167.6 cm (65.98\")   Wt 66.7 kg (147 lb)   BMI 23.74 kg/m²       CV:  Regular rate and rhythm  L:  Bilateral lung rise and fall, no use of accessory muscles   Abd:  Soft, nontender, nondistended  Ext:  No cyanosis, clubbing, edema  Bilateral inguinal hernias      Assessment/ Plan:  Assessment   Diagnoses and all orders for this visit:    1. Recurrent inguinal hernia of right side without obstruction or gangrene (Primary)    2. Left inguinal hernia    Mr. Cintron is a 64-year-old male with bilateral inguinal hernias.  He previously had an open right inguinal hernia repair.  He has noticed an increase bulge in that area over the past couple of weeks.  He is aware that we will need to wait until January to achieve the full year post cardiac intervention.  I will see him back at the beginning of January and we will obtain cardiac clearance prior to scheduling him for surgery in January for bilateral inguinal hernia repair.    Radha Barnes MD       General Surgeon  [facility]       "

## 2024-11-18 ENCOUNTER — OFFICE VISIT (OUTPATIENT)
Dept: CARDIOLOGY | Facility: CLINIC | Age: 64
End: 2024-11-18
Payer: MEDICAID

## 2024-11-18 VITALS
WEIGHT: 147.4 LBS | SYSTOLIC BLOOD PRESSURE: 108 MMHG | DIASTOLIC BLOOD PRESSURE: 70 MMHG | BODY MASS INDEX: 23.69 KG/M2 | HEIGHT: 66 IN | HEART RATE: 76 BPM | OXYGEN SATURATION: 96 %

## 2024-11-18 DIAGNOSIS — E78.5 HYPERLIPIDEMIA, UNSPECIFIED HYPERLIPIDEMIA TYPE: ICD-10-CM

## 2024-11-18 DIAGNOSIS — I25.10 CAD, MULTIPLE VESSEL: Primary | ICD-10-CM

## 2024-11-18 DIAGNOSIS — J44.9 CHRONIC OBSTRUCTIVE PULMONARY DISEASE, UNSPECIFIED COPD TYPE: ICD-10-CM

## 2024-11-18 DIAGNOSIS — I25.5 ISCHEMIC CARDIOMYOPATHY: ICD-10-CM

## 2024-11-18 DIAGNOSIS — N18.30 STAGE 3 CHRONIC KIDNEY DISEASE, UNSPECIFIED WHETHER STAGE 3A OR 3B CKD: ICD-10-CM

## 2024-11-18 DIAGNOSIS — I10 PRIMARY HYPERTENSION: ICD-10-CM

## 2024-11-18 NOTE — LETTER
November 26, 2024     OLIVIA Christopher  [address]  [address]  [facility] [address]    Patient: Florencio Cintron   YOB: 1960   Date of Visit: 11/18/2024     Dear OLIVIA Christopher:       Thank you for referring Florencio Cintron to me for evaluation. Below are the relevant portions of my assessment and plan of care.    If you have questions, please do not hesitate to call me. I look forward to following Florencio along with you.         Sincerely,        OLIVIA Barone        CC: No Recipients    Elinor Walls APRN  11/26/24 0922  Signed  Subjective    Florencio Cintron is a 64 y.o. male.   Chief Complaint   Patient presents with   • Coronary Artery Disease   • Cardiomyopathy      History of Present Illness   Florencio Cintron is a 64-year-old male who presents to clinic today for cardiology follow-up    Ischemic cardiomyopathy and heart failure with recovered ejection fraction currently on Entresto and Coreg. He reports BP has been soft recently and adjustments were made in his medications however he is unsure of dosing.  Kidney function has been stable (he is following with nephrology). He reports breathing has been stable, denies worsening dyspnea, orthopnea or abdominal or LE swelling.  Recent echo with recovered LVEF of 56 to 60%. Normal BNP at 213 on 9/3/2024.       Multivessel CAD.  He remains on aspirin and Plavix and denies bleeding issues.  He was initially referred to cardiothoracic surgeon Dr. Odonnell who recommended patient see Dr. Del Rio at  due to high risk.  Dr. Del Rio felt he was high risk as well and referred to interventional cardiology for considerations of repeat cardiac cath and possible PCI.  Successful intravascular lithotripsy assisted PCI to severe calcific 90% mid RCA stenosis with placement of a 3.0 x 30 mm [place] frontier drug-eluting stent, postdilated in the proximal to mid segments to 3.5 mm, severe ostial left circumflex disease not intervened upon and  medical management was recommended.  He reports doing well status post cath and feels his symptoms are improved.  He denies chest pain and feels his breathing is stable.  He reports compliance with medications.  He remains on aspirin, Imdur, Plavix, Coreg and Lipitor. LDL at 66 from labs on 9/3/2024.     CKD following with nephrology who has placed pt on Kerendia.      COPD following with pulmonology. Moderate pulmonary hypertension noted on recent echo likely related to COPD.  Discussed the importance of following with pulmonology and adherence to medical therapy.  Former smoker.    Patient Active Problem List   Diagnosis   • Ischemic cardiomyopathy   • Heart failure with reduced ejection fraction   • Coronary artery disease involving native coronary artery of native heart with angina pectoris   • Presence of external cardiac defibrillator   • Primary hypertension   • Chronic obstructive pulmonary disease   • Acute non-ST-segment elevation myocardial infarction   • Kidney disease   • On home O2   • Oxygen deficiency   • Syncope and collapse   • Left inguinal hernia   • Recurrent inguinal hernia of right side without obstruction or gangrene     Past Medical History:   Diagnosis Date   • Chronic kidney disease    • COPD (chronic obstructive pulmonary disease)    • Coronary artery disease    • Hypertension    • Myocardial infarction    • Stroke      Past Surgical History:   Procedure Laterality Date   • APPENDECTOMY     • CARDIAC CATHETERIZATION      2024  --1 STENT   • HEMORRHOIDECTOMY     • TONSILLECTOMY         Family History   Problem Relation Age of Onset   • Heart disease Mother    • Arthritis Brother      Social History     Tobacco Use   • Smoking status: Former     Current packs/day: 0.00     Average packs/day: 2.0 packs/day for 45.0 years (90.0 ttl pk-yrs)     Types: Cigarettes     Start date: 1975     Quit date: 2020     Years since quittin.6     Passive exposure: Past   • Smokeless tobacco:  Never   Vaping Use   • Vaping status: Never Used   Substance Use Topics   • Alcohol use: Not Currently     Comment: qnce a week   • Drug use: Not Currently         The following portions of the patient's history were reviewed and updated as appropriate: allergies, current medications, past family history, past medical history, past social history, past surgical history and problem list.    No Known Allergies      Current Outpatient Medications:   •  Aspirin Low Dose 81 MG EC tablet, Take 1 tablet by mouth Daily., Disp: , Rfl:   •  atorvastatin (LIPITOR) 40 MG tablet, Take 2 tablets by mouth Daily. for cholesterol, Disp: 90 tablet, Rfl: 0  •  Budeson-Glycopyrrol-Formoterol (Breztri Aerosphere) 160-9-4.8 MCG/ACT aerosol inhaler, Inhale 2 puffs 2 (Two) Times a Day., Disp: 10.7 g, Rfl: 5  •  clopidogrel (Plavix) 75 MG tablet, Take 1 tablet by mouth Daily., Disp: 30 tablet, Rfl: 0  •  Earwax Removal 6.5 % otic solution, INSTILL 4 DROPS IN AFFECTED EAR(S) TWO TIMES A DAY FOR 5 DAYS, Disp: , Rfl:   •  isosorbide mononitrate (IMDUR) 30 MG 24 hr tablet, Take 1 tablet by mouth Daily., Disp: 90 tablet, Rfl: 0  •  Kerendia 10 MG tablet, Take 1 tablet by mouth Daily., Disp: , Rfl:   •  sacubitril-valsartan (Entresto) 49-51 MG tablet, Take 1 tablet by mouth 2 (Two) Times a Day., Disp: 60 tablet, Rfl: 0  •  tamsulosin (FLOMAX) 0.4 MG capsule 24 hr capsule, TAKE ONE CAPSULE BY MOUTH DAILY FOR PROSTATE, Disp: , Rfl:   •  Ventolin  (90 Base) MCG/ACT inhaler, Inhale 2 puffs Every 4 (Four) Hours As Needed for Wheezing., Disp: 18 g, Rfl: 5  •  carvedilol (COREG) 3.125 MG tablet, Take 8 tablets by mouth 2 (Two) Times a Day With Meals for 30 days., Disp: 60 tablet, Rfl: 0    Review of Systems   Constitutional:  Negative for activity change, appetite change, chills, diaphoresis, fatigue and fever.   HENT:  Negative for congestion, drooling, ear discharge, ear pain, mouth sores, nosebleeds, postnasal drip, rhinorrhea, sinus pressure,  "sneezing and sore throat.    Eyes:  Negative for pain, discharge and visual disturbance.   Respiratory:  Negative for cough, chest tightness, shortness of breath and wheezing.    Cardiovascular:  Negative for chest pain, palpitations and leg swelling.   Gastrointestinal:  Negative for abdominal pain, constipation, diarrhea, nausea and vomiting.   Endocrine: Negative for cold intolerance, heat intolerance, polydipsia, polyphagia and polyuria.   Musculoskeletal:  Negative for arthralgias, myalgias and neck pain.   Skin:  Negative for rash and wound.   Neurological:  Negative for dizziness, syncope, speech difficulty, weakness, light-headedness and headaches.   Hematological:  Negative for adenopathy. Does not bruise/bleed easily.   Psychiatric/Behavioral:  Negative for confusion, dysphoric mood and sleep disturbance. The patient is not nervous/anxious.    All other systems reviewed and are negative.    /70 (BP Location: Left arm, Patient Position: Sitting, Cuff Size: Adult)   Pulse 76   Ht 167.6 cm (65.98\")   Wt 66.9 kg (147 lb 6.4 oz)   SpO2 96%   BMI 23.81 kg/m²     Objective  No Known Allergies    Physical Exam  Vitals reviewed.   Constitutional:       Appearance: Normal appearance. He is well-developed.   HENT:      Head: Normocephalic.   Eyes:      Conjunctiva/sclera: Conjunctivae normal.   Neck:      Thyroid: No thyromegaly.      Vascular: No carotid bruit or JVD.   Cardiovascular:      Rate and Rhythm: Normal rate and regular rhythm.   Pulmonary:      Effort: Pulmonary effort is normal.      Breath sounds: Decreased breath sounds and wheezing (diffuse) present.   Musculoskeletal:      Cervical back: Neck supple.      Right lower leg: No edema.      Left lower leg: No edema.   Skin:     General: Skin is warm and dry.   Neurological:      Mental Status: He is alert and oriented to person, place, and time.   Psychiatric:         Attention and Perception: Attention normal.         Mood and Affect: Mood " normal.         Speech: Speech normal.         Behavior: Behavior normal. Behavior is cooperative.         Cognition and Memory: Cognition normal.     LABS  WBC   Date Value Ref Range Status   07/08/2024 8.58 3.40 - 10.80 10*3/mm3 Final   01/11/2024 6.97 3.70 - 10.30 10*3/uL Final     RBC   Date Value Ref Range Status   07/08/2024 4.18 4.14 - 5.80 10*6/mm3 Final   01/11/2024 4.14 (L) 4.60 - 6.10 10*6/uL Final     Hemoglobin   Date Value Ref Range Status   07/08/2024 12.4 (L) 13.0 - 17.7 g/dL Final   01/11/2024 11.8 (L) 13.7 - 17.5 g/dL Final     Hematocrit   Date Value Ref Range Status   07/08/2024 37.9 37.5 - 51.0 % Final   01/11/2024 36.6 (L) 40.0 - 51.0 % Final     MCV   Date Value Ref Range Status   07/08/2024 90.7 79.0 - 97.0 fL Final   01/11/2024 88 79 - 98 fL Final     MCH   Date Value Ref Range Status   07/08/2024 29.7 26.6 - 33.0 pg Final   01/11/2024 28.5 26.0 - 32.0 pg Final     MCHC   Date Value Ref Range Status   07/08/2024 32.7 31.5 - 35.7 g/dL Final   01/11/2024 32.2 30.7 - 35.5 g/dL Final     RDW   Date Value Ref Range Status   07/08/2024 13.6 12.3 - 15.4 % Final   01/11/2024 13.4 11.5 - 14.5 % Final     RDW-SD   Date Value Ref Range Status   07/08/2024 44.6 37.0 - 54.0 fl Final     MPV   Date Value Ref Range Status   07/08/2024 8.2 6.0 - 12.0 fL Final   01/11/2024 8.4 (L) 8.8 - 12.5 fL Final     Platelets   Date Value Ref Range Status   07/08/2024 322 140 - 450 10*3/mm3 Final   01/11/2024 214 155 - 369 10*3/uL Final     Neutrophil %   Date Value Ref Range Status   07/08/2024 65.2 42.7 - 76.0 % Final     Lymphocyte %   Date Value Ref Range Status   07/08/2024 21.4 19.6 - 45.3 % Final     Monocyte %   Date Value Ref Range Status   07/08/2024 8.5 5.0 - 12.0 % Final     Eosinophil %   Date Value Ref Range Status   07/08/2024 4.1 0.3 - 6.2 % Final     Basophil %   Date Value Ref Range Status   07/08/2024 0.6 0.0 - 1.5 % Final     Immature Grans %   Date Value Ref Range Status   07/08/2024 0.2 0.0 - 0.5 %  Final     Neutrophils, Absolute   Date Value Ref Range Status   07/08/2024 5.59 1.70 - 7.00 10*3/mm3 Final     Lymphocytes, Absolute   Date Value Ref Range Status   07/08/2024 1.84 0.70 - 3.10 10*3/mm3 Final     Monocytes, Absolute   Date Value Ref Range Status   07/08/2024 0.73 0.10 - 0.90 10*3/mm3 Final     Eosinophils, Absolute   Date Value Ref Range Status   07/08/2024 0.35 0.00 - 0.40 10*3/mm3 Final     Basophils, Absolute   Date Value Ref Range Status   07/08/2024 0.05 0.00 - 0.20 10*3/mm3 Final     Immature Grans, Absolute   Date Value Ref Range Status   07/08/2024 0.02 0.00 - 0.05 10*3/mm3 Final     nRBC   Date Value Ref Range Status   07/08/2024 0.0 0.0 - 0.2 /100 WBC Final       Total Cholesterol   Date Value Ref Range Status   09/03/2024 125 0 - 200 mg/dL Final     Triglycerides   Date Value Ref Range Status   09/03/2024 63 0 - 150 mg/dL Final     HDL Cholesterol   Date Value Ref Range Status   09/03/2024 46 40 - 60 mg/dL Final     LDL Cholesterol    Date Value Ref Range Status   09/03/2024 66 0 - 100 mg/dL Final     IMAGING   CT Chest Low Dose Cancer Screening [facility]    Result Date: 10/8/2024   1. 1 cm predominantly ground-glass density in the right lung. Lung RADS category 3, recommend short interval follow-up CT at 3 to 6 months.       This report was finalized on 10/8/2024 8:44 AM by Dr. Shawn Pendleton MD.              Assessment & Plan  Diagnoses and all orders for this visit:    1. CAD, multiple vessel (Primary)  Clinically asymptomatic  Continue aspirin, Plavix, Imdur, beta-blocker and statin    2. Ischemic cardiomyopathy  Recovered LVEF, clinically asymptomatic  Continue Entresto and Coreg   patient unable to tolerate MRA secondary to CKD    3. Primary hypertension  Well-controlled, continue current therapy  Routine monitoring recommend  Sodium restrictions recommended     4. Hyperlipidemia, unspecified hyperlipidemia type  Continue statin, heart healthy diet, LDL at 66    5. Stage 3 chronic kidney  disease, unspecified whether stage 3a or 3b CKD  Continue to follow with nephrology, kidney function stable    6. Chronic obstructive pulmonary disease, unspecified COPD type  Management by pulmonology      Review of medical record including recent lab    Lifestyle modifications including heart healthy diet, regular exercise, maintenance of desirable body weight and avoidance of tobacco products    Follow up in several months, sooner if needed.

## 2024-11-18 NOTE — PROGRESS NOTES
Subjective     Florencio Cintron is a 64 y.o. male.   Chief Complaint   Patient presents with    Coronary Artery Disease    Cardiomyopathy      History of Present Illness   Florencio Cintron is a 64-year-old male who presents to clinic today for cardiology follow-up    Ischemic cardiomyopathy and heart failure with recovered ejection fraction currently on Entresto and Coreg. He reports BP has been soft recently and adjustments were made in his medications however he is unsure of dosing.  Kidney function has been stable (he is following with nephrology). He reports breathing has been stable, denies worsening dyspnea, orthopnea or abdominal or LE swelling.  Recent echo with recovered LVEF of 56 to 60%. Normal BNP at 213 on 9/3/2024.       Multivessel CAD.  He remains on aspirin and Plavix and denies bleeding issues.  He was initially referred to cardiothoracic surgeon Dr. Odonnell who recommended patient see Dr. Del Rio at  due to high risk.  Dr. Del Rio felt he was high risk as well and referred to interventional cardiology for considerations of repeat cardiac cath and possible PCI.  Successful intravascular lithotripsy assisted PCI to severe calcific 90% mid RCA stenosis with placement of a 3.0 x 30 mm [place] frontier drug-eluting stent, postdilated in the proximal to mid segments to 3.5 mm, severe ostial left circumflex disease not intervened upon and medical management was recommended.  He reports doing well status post cath and feels his symptoms are improved.  He denies chest pain and feels his breathing is stable.  He reports compliance with medications.  He remains on aspirin, Imdur, Plavix, Coreg and Lipitor. LDL at 66 from labs on 9/3/2024.     CKD following with nephrology who has placed pt on Kerendia.      COPD following with pulmonology. Moderate pulmonary hypertension noted on recent echo likely related to COPD.  Discussed the importance of following with pulmonology and adherence to medical therapy.   Former smoker.    Patient Active Problem List   Diagnosis    Ischemic cardiomyopathy    Heart failure with reduced ejection fraction    Coronary artery disease involving native coronary artery of native heart with angina pectoris    Presence of external cardiac defibrillator    Primary hypertension    Chronic obstructive pulmonary disease    Acute non-ST-segment elevation myocardial infarction    Kidney disease    On home O2    Oxygen deficiency    Syncope and collapse    Left inguinal hernia    Recurrent inguinal hernia of right side without obstruction or gangrene     Past Medical History:   Diagnosis Date    Chronic kidney disease     COPD (chronic obstructive pulmonary disease)     Coronary artery disease     Hypertension     Myocardial infarction     Stroke      Past Surgical History:   Procedure Laterality Date    APPENDECTOMY      CARDIAC CATHETERIZATION      2024  --1 STENT    HEMORRHOIDECTOMY      TONSILLECTOMY         Family History   Problem Relation Age of Onset    Heart disease Mother     Arthritis Brother      Social History     Tobacco Use    Smoking status: Former     Current packs/day: 0.00     Average packs/day: 2.0 packs/day for 45.0 years (90.0 ttl pk-yrs)     Types: Cigarettes     Start date: 1975     Quit date: 2020     Years since quittin.6     Passive exposure: Past    Smokeless tobacco: Never   Vaping Use    Vaping status: Never Used   Substance Use Topics    Alcohol use: Not Currently     Comment: qnce a week    Drug use: Not Currently         The following portions of the patient's history were reviewed and updated as appropriate: allergies, current medications, past family history, past medical history, past social history, past surgical history and problem list.    No Known Allergies      Current Outpatient Medications:     Aspirin Low Dose 81 MG EC tablet, Take 1 tablet by mouth Daily., Disp: , Rfl:     atorvastatin (LIPITOR) 40 MG tablet, Take 2 tablets by mouth Daily.  for cholesterol, Disp: 90 tablet, Rfl: 0    Budeson-Glycopyrrol-Formoterol (Breztri Aerosphere) 160-9-4.8 MCG/ACT aerosol inhaler, Inhale 2 puffs 2 (Two) Times a Day., Disp: 10.7 g, Rfl: 5    clopidogrel (Plavix) 75 MG tablet, Take 1 tablet by mouth Daily., Disp: 30 tablet, Rfl: 0    Earwax Removal 6.5 % otic solution, INSTILL 4 DROPS IN AFFECTED EAR(S) TWO TIMES A DAY FOR 5 DAYS, Disp: , Rfl:     isosorbide mononitrate (IMDUR) 30 MG 24 hr tablet, Take 1 tablet by mouth Daily., Disp: 90 tablet, Rfl: 0    Kerendia 10 MG tablet, Take 1 tablet by mouth Daily., Disp: , Rfl:     sacubitril-valsartan (Entresto) 49-51 MG tablet, Take 1 tablet by mouth 2 (Two) Times a Day., Disp: 60 tablet, Rfl: 0    tamsulosin (FLOMAX) 0.4 MG capsule 24 hr capsule, TAKE ONE CAPSULE BY MOUTH DAILY FOR PROSTATE, Disp: , Rfl:     Ventolin  (90 Base) MCG/ACT inhaler, Inhale 2 puffs Every 4 (Four) Hours As Needed for Wheezing., Disp: 18 g, Rfl: 5    carvedilol (COREG) 3.125 MG tablet, Take 8 tablets by mouth 2 (Two) Times a Day With Meals for 30 days., Disp: 60 tablet, Rfl: 0    Review of Systems   Constitutional:  Negative for activity change, appetite change, chills, diaphoresis, fatigue and fever.   HENT:  Negative for congestion, drooling, ear discharge, ear pain, mouth sores, nosebleeds, postnasal drip, rhinorrhea, sinus pressure, sneezing and sore throat.    Eyes:  Negative for pain, discharge and visual disturbance.   Respiratory:  Negative for cough, chest tightness, shortness of breath and wheezing.    Cardiovascular:  Negative for chest pain, palpitations and leg swelling.   Gastrointestinal:  Negative for abdominal pain, constipation, diarrhea, nausea and vomiting.   Endocrine: Negative for cold intolerance, heat intolerance, polydipsia, polyphagia and polyuria.   Musculoskeletal:  Negative for arthralgias, myalgias and neck pain.   Skin:  Negative for rash and wound.   Neurological:  Negative for dizziness, syncope, speech  "difficulty, weakness, light-headedness and headaches.   Hematological:  Negative for adenopathy. Does not bruise/bleed easily.   Psychiatric/Behavioral:  Negative for confusion, dysphoric mood and sleep disturbance. The patient is not nervous/anxious.    All other systems reviewed and are negative.    /70 (BP Location: Left arm, Patient Position: Sitting, Cuff Size: Adult)   Pulse 76   Ht 167.6 cm (65.98\")   Wt 66.9 kg (147 lb 6.4 oz)   SpO2 96%   BMI 23.81 kg/m²     Objective   No Known Allergies    Physical Exam  Vitals reviewed.   Constitutional:       Appearance: Normal appearance. He is well-developed.   HENT:      Head: Normocephalic.   Eyes:      Conjunctiva/sclera: Conjunctivae normal.   Neck:      Thyroid: No thyromegaly.      Vascular: No carotid bruit or JVD.   Cardiovascular:      Rate and Rhythm: Normal rate and regular rhythm.   Pulmonary:      Effort: Pulmonary effort is normal.      Breath sounds: Decreased breath sounds and wheezing (diffuse) present.   Musculoskeletal:      Cervical back: Neck supple.      Right lower leg: No edema.      Left lower leg: No edema.   Skin:     General: Skin is warm and dry.   Neurological:      Mental Status: He is alert and oriented to person, place, and time.   Psychiatric:         Attention and Perception: Attention normal.         Mood and Affect: Mood normal.         Speech: Speech normal.         Behavior: Behavior normal. Behavior is cooperative.         Cognition and Memory: Cognition normal.     LABS  WBC   Date Value Ref Range Status   07/08/2024 8.58 3.40 - 10.80 10*3/mm3 Final   01/11/2024 6.97 3.70 - 10.30 10*3/uL Final     RBC   Date Value Ref Range Status   07/08/2024 4.18 4.14 - 5.80 10*6/mm3 Final   01/11/2024 4.14 (L) 4.60 - 6.10 10*6/uL Final     Hemoglobin   Date Value Ref Range Status   07/08/2024 12.4 (L) 13.0 - 17.7 g/dL Final   01/11/2024 11.8 (L) 13.7 - 17.5 g/dL Final     Hematocrit   Date Value Ref Range Status   07/08/2024 37.9 " 37.5 - 51.0 % Final   01/11/2024 36.6 (L) 40.0 - 51.0 % Final     MCV   Date Value Ref Range Status   07/08/2024 90.7 79.0 - 97.0 fL Final   01/11/2024 88 79 - 98 fL Final     MCH   Date Value Ref Range Status   07/08/2024 29.7 26.6 - 33.0 pg Final   01/11/2024 28.5 26.0 - 32.0 pg Final     MCHC   Date Value Ref Range Status   07/08/2024 32.7 31.5 - 35.7 g/dL Final   01/11/2024 32.2 30.7 - 35.5 g/dL Final     RDW   Date Value Ref Range Status   07/08/2024 13.6 12.3 - 15.4 % Final   01/11/2024 13.4 11.5 - 14.5 % Final     RDW-SD   Date Value Ref Range Status   07/08/2024 44.6 37.0 - 54.0 fl Final     MPV   Date Value Ref Range Status   07/08/2024 8.2 6.0 - 12.0 fL Final   01/11/2024 8.4 (L) 8.8 - 12.5 fL Final     Platelets   Date Value Ref Range Status   07/08/2024 322 140 - 450 10*3/mm3 Final   01/11/2024 214 155 - 369 10*3/uL Final     Neutrophil %   Date Value Ref Range Status   07/08/2024 65.2 42.7 - 76.0 % Final     Lymphocyte %   Date Value Ref Range Status   07/08/2024 21.4 19.6 - 45.3 % Final     Monocyte %   Date Value Ref Range Status   07/08/2024 8.5 5.0 - 12.0 % Final     Eosinophil %   Date Value Ref Range Status   07/08/2024 4.1 0.3 - 6.2 % Final     Basophil %   Date Value Ref Range Status   07/08/2024 0.6 0.0 - 1.5 % Final     Immature Grans %   Date Value Ref Range Status   07/08/2024 0.2 0.0 - 0.5 % Final     Neutrophils, Absolute   Date Value Ref Range Status   07/08/2024 5.59 1.70 - 7.00 10*3/mm3 Final     Lymphocytes, Absolute   Date Value Ref Range Status   07/08/2024 1.84 0.70 - 3.10 10*3/mm3 Final     Monocytes, Absolute   Date Value Ref Range Status   07/08/2024 0.73 0.10 - 0.90 10*3/mm3 Final     Eosinophils, Absolute   Date Value Ref Range Status   07/08/2024 0.35 0.00 - 0.40 10*3/mm3 Final     Basophils, Absolute   Date Value Ref Range Status   07/08/2024 0.05 0.00 - 0.20 10*3/mm3 Final     Immature Grans, Absolute   Date Value Ref Range Status   07/08/2024 0.02 0.00 - 0.05 10*3/mm3 Final      nRBC   Date Value Ref Range Status   07/08/2024 0.0 0.0 - 0.2 /100 WBC Final       Total Cholesterol   Date Value Ref Range Status   09/03/2024 125 0 - 200 mg/dL Final     Triglycerides   Date Value Ref Range Status   09/03/2024 63 0 - 150 mg/dL Final     HDL Cholesterol   Date Value Ref Range Status   09/03/2024 46 40 - 60 mg/dL Final     LDL Cholesterol    Date Value Ref Range Status   09/03/2024 66 0 - 100 mg/dL Final     IMAGING   CT Chest Low Dose Cancer Screening [facility]    Result Date: 10/8/2024   1. 1 cm predominantly ground-glass density in the right lung. Lung RADS category 3, recommend short interval follow-up CT at 3 to 6 months.       This report was finalized on 10/8/2024 8:44 AM by Dr. Shawn Pendleton MD.              Assessment & Plan   Diagnoses and all orders for this visit:    1. CAD, multiple vessel (Primary)  Clinically asymptomatic  Continue aspirin, Plavix, Imdur, beta-blocker and statin    2. Ischemic cardiomyopathy  Recovered LVEF, clinically asymptomatic  Continue Entresto and Coreg   patient unable to tolerate MRA secondary to CKD    3. Primary hypertension  Well-controlled, continue current therapy  Routine monitoring recommend  Sodium restrictions recommended     4. Hyperlipidemia, unspecified hyperlipidemia type  Continue statin, heart healthy diet, LDL at 66    5. Stage 3 chronic kidney disease, unspecified whether stage 3a or 3b CKD  Continue to follow with nephrology, kidney function stable    6. Chronic obstructive pulmonary disease, unspecified COPD type  Management by pulmonology      Review of medical record including recent lab    Lifestyle modifications including heart healthy diet, regular exercise, maintenance of desirable body weight and avoidance of tobacco products    Follow up in several months, sooner if needed.

## 2024-12-17 RX ORDER — BUDESONIDE, GLYCOPYRROLATE, AND FORMOTEROL FUMARATE 160; 9; 4.8 UG/1; UG/1; UG/1
AEROSOL, METERED RESPIRATORY (INHALATION)
Qty: 1 EACH | Refills: 3 | Status: SHIPPED | OUTPATIENT
Start: 2024-12-17

## 2024-12-17 RX ORDER — ALBUTEROL SULFATE 90 UG/1
AEROSOL, METERED RESPIRATORY (INHALATION)
Qty: 1 G | Refills: 5 | Status: SHIPPED | OUTPATIENT
Start: 2024-12-17

## 2025-01-02 ENCOUNTER — OFFICE VISIT (OUTPATIENT)
Age: 65
End: 2025-01-02
Payer: MEDICAID

## 2025-01-02 VITALS — WEIGHT: 147 LBS | BODY MASS INDEX: 23.63 KG/M2 | HEIGHT: 66 IN

## 2025-01-02 DIAGNOSIS — K40.90 LEFT INGUINAL HERNIA: ICD-10-CM

## 2025-01-02 DIAGNOSIS — K40.91 RECURRENT INGUINAL HERNIA OF RIGHT SIDE WITHOUT OBSTRUCTION OR GANGRENE: Primary | ICD-10-CM

## 2025-01-02 RX ORDER — SODIUM CHLORIDE 0.9 % (FLUSH) 0.9 %
3 SYRINGE (ML) INJECTION EVERY 12 HOURS SCHEDULED
OUTPATIENT
Start: 2025-01-02

## 2025-01-02 RX ORDER — ALBUTEROL SULFATE 90 UG/1
2 INHALANT RESPIRATORY (INHALATION) EVERY 4 HOURS PRN
Qty: 1 G | Refills: 5 | Status: SHIPPED | OUTPATIENT
Start: 2025-01-02

## 2025-01-02 RX ORDER — BUDESONIDE, GLYCOPYRROLATE, AND FORMOTEROL FUMARATE 160; 9; 4.8 UG/1; UG/1; UG/1
2 AEROSOL, METERED RESPIRATORY (INHALATION) 2 TIMES DAILY
Qty: 1 EACH | Refills: 3 | Status: SHIPPED | OUTPATIENT
Start: 2025-01-02

## 2025-01-02 RX ORDER — SODIUM CHLORIDE 0.9 % (FLUSH) 0.9 %
10 SYRINGE (ML) INJECTION AS NEEDED
OUTPATIENT
Start: 2025-01-02

## 2025-01-02 RX ORDER — SODIUM CHLORIDE 9 MG/ML
40 INJECTION, SOLUTION INTRAVENOUS AS NEEDED
OUTPATIENT
Start: 2025-01-02

## 2025-01-02 NOTE — H&P (VIEW-ONLY)
Date of Service: 2025    Subjective   Florencio Cintron is a 64 y.o. male is being in consultation today at the request of Brisa Billingsley APRN    Chief Complaint: bilateral inguinal hernia (right recurrent and left hernia)    Florencio Cintron is a 64 y.o. male who presents with a recurrent right inguinal hernia and left inguinal hernia. He has a history of cardiac stents in 2024. He has been on aspirin and plavix since this operation. He presents today (one year after stenting) to evaluate for hernia repair.     Past Medical History:   Diagnosis Date    Chronic kidney disease     COPD (chronic obstructive pulmonary disease)     Coronary artery disease     Hypertension     Myocardial infarction     Stroke        Past Surgical History:   Procedure Laterality Date    APPENDECTOMY      CARDIAC CATHETERIZATION      2024  --1 STENT    HEMORRHOIDECTOMY      TONSILLECTOMY           Family History   Problem Relation Age of Onset    Heart disease Mother     Arthritis Brother          Social History     Socioeconomic History    Marital status:     Number of children: 3   Tobacco Use    Smoking status: Former     Current packs/day: 0.00     Average packs/day: 2.0 packs/day for 45.0 years (90.0 ttl pk-yrs)     Types: Cigarettes     Start date: 1975     Quit date: 2020     Years since quittin.7     Passive exposure: Past    Smokeless tobacco: Never   Vaping Use    Vaping status: Never Used   Substance and Sexual Activity    Alcohol use: Not Currently     Comment: qnce a week    Drug use: Not Currently    Sexual activity: Defer        Review of Systems   Pertinent items are noted in HPI     Constitutional: No fevers, chills or malaise. No unintentional weight loss   Eyes: Denies visual changes    Cardiovascular: Denies chest pain, palpitations   Respiratory: Denies cough or shortness of breath   Abdominal/Gastrointestinal: No abdominal pain, no nausea/vomiting   Genitourinary: Denies dysuria  "or hematuria   Musculoskeletal: Denies any chronic joint aches, pains or deformities              Skin: No lesions or rashes   Psychiatric: No recent mood changes   Neurologic: No paresthesias or loss of function        Objective       Physical Exam:      01/02/25  1053   Weight: 66.7 kg (147 lb)    Body mass index is 23.74 kg/m².  Constitution: Ht 167.6 cm (65.98\")   Wt 66.7 kg (147 lb)   BMI 23.74 kg/m²  . No acute distress  Head: Normocephalic, atraumatic.   Eyes: Aligned without strabismus. Conjunctivae noninjected   Ears, Nose, Mouth:no lesions noted  CV: Regular rate and rhythm   Respiratory: Symmetric chest expansion. No respiratory distress.   Gastrointestinal:  Soft, nontender, nondistended   Skin:  No cyanosis, clubbing or edema bilaterally  Neurologic: No gross deficits.  Alert and oriented x3  Psychiatric: Normal mood  Groin: bilateral inguinal hernias       Assessment   Diagnoses and all orders for this visit:    1. Recurrent inguinal hernia of right side without obstruction or gangrene (Primary)  -     Case Request; Standing  -     Follow Anesthesia Guidelines / Protocol; Future  -     Follow Anesthesia Guidelines / Protocol; Standing  -     Verify NPO Status; Standing  -     Verify / Perform Chlorhexidine Skin Prep; Standing  -     Instructions on coughing, deep breathing, and incentive spirometry.; Standing  -     MRSA Screen, PCR (Inpatient) - Swab, Nares; Standing  -     Insert Peripheral IV; Standing  -     Saline Lock & Maintain IV Access; Standing  -     sodium chloride 0.9 % flush 3 mL  -     sodium chloride 0.9 % flush 10 mL  -     sodium chloride 0.9 % infusion 40 mL  -     Place Sequential Compression Device; Standing  -     Maintain Sequential Compression Device; Standing  -     ceFAZolin 2000 mg IVPB in 100 mL NS (VTB)  -     Case Request  -     MRSA Screen, PCR (Inpatient) - Swab, Nares    2. Left inguinal hernia  -     Case Request; Standing  -     Follow Anesthesia Guidelines / " Protocol; Future  -     Follow Anesthesia Guidelines / Protocol; Standing  -     Verify NPO Status; Standing  -     Verify / Perform Chlorhexidine Skin Prep; Standing  -     Instructions on coughing, deep breathing, and incentive spirometry.; Standing  -     MRSA Screen, PCR (Inpatient) - Swab, Nares; Standing  -     Insert Peripheral IV; Standing  -     Saline Lock & Maintain IV Access; Standing  -     sodium chloride 0.9 % flush 3 mL  -     sodium chloride 0.9 % flush 10 mL  -     sodium chloride 0.9 % infusion 40 mL  -     Place Sequential Compression Device; Standing  -     Maintain Sequential Compression Device; Standing  -     ceFAZolin 2000 mg IVPB in 100 mL NS (VTB)  -     Case Request  -     MRSA Screen, PCR (Inpatient) - Swab, Nares      Florencio Cintron is a 64 y.o. male with bilateral inguinal hernias (right recurrent and left new hernia). Will plan for a bilateral robotic inguinal hernia repair. Will discuss with cardiology with plan for holding plavix for 5 days beforehand. OK to continue the aspirin.          Radha Barnes MD  [facility]- General Surgery

## 2025-01-02 NOTE — PROGRESS NOTES
Date of Service: 2025    Subjective   Florencio Cintron is a 64 y.o. male is being in consultation today at the request of Brisa Billingsley APRN    Chief Complaint: bilateral inguinal hernia (right recurrent and left hernia)    Florencio Cintron is a 64 y.o. male who presents with a recurrent right inguinal hernia and left inguinal hernia. He has a history of cardiac stents in 2024. He has been on aspirin and plavix since this operation. He presents today (one year after stenting) to evaluate for hernia repair.     Past Medical History:   Diagnosis Date    Chronic kidney disease     COPD (chronic obstructive pulmonary disease)     Coronary artery disease     Hypertension     Myocardial infarction     Stroke        Past Surgical History:   Procedure Laterality Date    APPENDECTOMY      CARDIAC CATHETERIZATION      2024  --1 STENT    HEMORRHOIDECTOMY      TONSILLECTOMY           Family History   Problem Relation Age of Onset    Heart disease Mother     Arthritis Brother          Social History     Socioeconomic History    Marital status:     Number of children: 3   Tobacco Use    Smoking status: Former     Current packs/day: 0.00     Average packs/day: 2.0 packs/day for 45.0 years (90.0 ttl pk-yrs)     Types: Cigarettes     Start date: 1975     Quit date: 2020     Years since quittin.7     Passive exposure: Past    Smokeless tobacco: Never   Vaping Use    Vaping status: Never Used   Substance and Sexual Activity    Alcohol use: Not Currently     Comment: qnce a week    Drug use: Not Currently    Sexual activity: Defer        Review of Systems   Pertinent items are noted in HPI     Constitutional: No fevers, chills or malaise. No unintentional weight loss   Eyes: Denies visual changes    Cardiovascular: Denies chest pain, palpitations   Respiratory: Denies cough or shortness of breath   Abdominal/Gastrointestinal: No abdominal pain, no nausea/vomiting   Genitourinary: Denies dysuria  "or hematuria   Musculoskeletal: Denies any chronic joint aches, pains or deformities              Skin: No lesions or rashes   Psychiatric: No recent mood changes   Neurologic: No paresthesias or loss of function        Objective       Physical Exam:      01/02/25  1053   Weight: 66.7 kg (147 lb)    Body mass index is 23.74 kg/m².  Constitution: Ht 167.6 cm (65.98\")   Wt 66.7 kg (147 lb)   BMI 23.74 kg/m²  . No acute distress  Head: Normocephalic, atraumatic.   Eyes: Aligned without strabismus. Conjunctivae noninjected   Ears, Nose, Mouth:no lesions noted  CV: Regular rate and rhythm   Respiratory: Symmetric chest expansion. No respiratory distress.   Gastrointestinal:  Soft, nontender, nondistended   Skin:  No cyanosis, clubbing or edema bilaterally  Neurologic: No gross deficits.  Alert and oriented x3  Psychiatric: Normal mood  Groin: bilateral inguinal hernias       Assessment   Diagnoses and all orders for this visit:    1. Recurrent inguinal hernia of right side without obstruction or gangrene (Primary)  -     Case Request; Standing  -     Follow Anesthesia Guidelines / Protocol; Future  -     Follow Anesthesia Guidelines / Protocol; Standing  -     Verify NPO Status; Standing  -     Verify / Perform Chlorhexidine Skin Prep; Standing  -     Instructions on coughing, deep breathing, and incentive spirometry.; Standing  -     MRSA Screen, PCR (Inpatient) - Swab, Nares; Standing  -     Insert Peripheral IV; Standing  -     Saline Lock & Maintain IV Access; Standing  -     sodium chloride 0.9 % flush 3 mL  -     sodium chloride 0.9 % flush 10 mL  -     sodium chloride 0.9 % infusion 40 mL  -     Place Sequential Compression Device; Standing  -     Maintain Sequential Compression Device; Standing  -     ceFAZolin 2000 mg IVPB in 100 mL NS (VTB)  -     Case Request  -     MRSA Screen, PCR (Inpatient) - Swab, Nares    2. Left inguinal hernia  -     Case Request; Standing  -     Follow Anesthesia Guidelines / " Protocol; Future  -     Follow Anesthesia Guidelines / Protocol; Standing  -     Verify NPO Status; Standing  -     Verify / Perform Chlorhexidine Skin Prep; Standing  -     Instructions on coughing, deep breathing, and incentive spirometry.; Standing  -     MRSA Screen, PCR (Inpatient) - Swab, Nares; Standing  -     Insert Peripheral IV; Standing  -     Saline Lock & Maintain IV Access; Standing  -     sodium chloride 0.9 % flush 3 mL  -     sodium chloride 0.9 % flush 10 mL  -     sodium chloride 0.9 % infusion 40 mL  -     Place Sequential Compression Device; Standing  -     Maintain Sequential Compression Device; Standing  -     ceFAZolin 2000 mg IVPB in 100 mL NS (VTB)  -     Case Request  -     MRSA Screen, PCR (Inpatient) - Swab, Nares      Florencio Cintron is a 64 y.o. male with bilateral inguinal hernias (right recurrent and left new hernia). Will plan for a bilateral robotic inguinal hernia repair. Will discuss with cardiology with plan for holding plavix for 5 days beforehand. OK to continue the aspirin.          Radha Barnes MD  [facility]- General Surgery

## 2025-01-03 ENCOUNTER — TELEPHONE (OUTPATIENT)
Dept: CARDIOLOGY | Facility: CLINIC | Age: 65
End: 2025-01-03
Payer: MEDICAID

## 2025-01-03 NOTE — TELEPHONE ENCOUNTER
----- Message from Elinor Walls sent at 1/3/2025  8:43 AM EST -----  Message for Florencio Cintron 1960 received from Ashley Collester, MA. Doing a Hernia repair on Mr. Cintron and Dr. Arellano is needing to hold his plavix starting 01/10 for his surgery scheduled 01/15 if that is ok.     Clearance to stop medication scanned into his chart.     Thanks,  Elinor

## 2025-01-10 NOTE — DISCHARGE INSTRUCTIONS
Please discontinue all blood thinners and anticoagulants (except aspirin) prior to surgery as per your surgeon and cardiologist instructions.  Aspirin may be continued up to the day prior to surgery.    HOLD all diabetic medications the morning of surgery as order by physician.    Please follow instructions on use of prep cloths provided by nurse. Return instruction sheet to pre-op nurse on day of surgery.    Arrival time for surgery on 1/15/25  will be given to you by Dr. Arellano's  Office.(0830 AM)    A RESPONSIBLE PERSON MUST REMAIN IN THE WAITING ROOM DURING YOUR PROCEDURE AND A RESPONSIBLE  MUST BE AVAILABLE UPON YOUR DISCHARGE.    General Instructions:  Do NOT eat or drink after midnight 1/14/25 which includes water, mints, or gum.  You may brush your teeth. Dental appliances that are removable must be taken out day of surgery.  Do NOT smoke, chew tobacco, or drink alcohol within 24 hours prior to surgery.  Bring medications in original bottles, any inhalers and if applicable your C-PAP/BI-PAP machine  Bring any papers given to you in the doctor’s office  Wear clean, comfortable clothes and socks  Do NOT wear contact lenses or make-up or dark nail polish.  Bring a case for your glasses if applicable.  Bring crutches or walker if applicable  Leave all other valuables and jewelry at home  If you were given a blood bank armband, continue to wear it until discharged.    Preventing a Surgical Site Infection:  Shower the night before surgery (unless instructed otherwise) using a fresh bar of anti-bacterial soap (such as Dial) and clean washcloth.  Dry with a clean towel and dress in clean clothing.  For 2 to 3 days before surgery, avoid shaving with a razor near where you will have surgery because the razor can irritate skin and make it easier to develop an infection.  Ask your surgeon if you will be receiving antibiotics prior to surgery.  Make sure you, your family, and all healthcare providers clean their  hands with soap and water or an alcohol-based hand  before caring for you or your wound.  If at all possible, quit smoking as many days before surgery as you can.    Day of Surgery:  Upon arrival, a pre-op nurse and anesthesiologist will review your health history, obtain vital signs, and answer questions you may have.  The only belongings needed at this time will be your home medications and if applicable you C-PAP/BI-PAP machine.  If you are staying overnight, your family can leave the rest of your belongings in the car and bring them to your room later.  A pre-op nurse will start an IV and you may receive medication in preparation for surgery.  Due to patient privacy and limited space, only one member of your family will be able to accompany you in the pre-op area.  While you are in surgery your family should notify the waiting room  if they leave the waiting room area and provide a contact number.  Please be aware that surgery does come with discomfort.  We want to make every effort to control your discomfort so please discuss any uncontrolled symptoms with your nurse.  Your doctor will most likely have prescribed pain medications.  If you are going home after surgery you will receive individualized written care instructions before being discharged.  A responsible adult must drive you to and from the hospital on the day of surgery and stay with you for 24 hours.  If you are staying overnight following surgery, you will be transported to your hospital room following the recovery period.

## 2025-01-13 ENCOUNTER — PRE-ADMISSION TESTING (OUTPATIENT)
Dept: PREADMISSION TESTING | Facility: HOSPITAL | Age: 65
End: 2025-01-13
Payer: MEDICAID

## 2025-01-13 DIAGNOSIS — K40.90 LEFT INGUINAL HERNIA: ICD-10-CM

## 2025-01-13 DIAGNOSIS — K40.91 RECURRENT INGUINAL HERNIA OF RIGHT SIDE WITHOUT OBSTRUCTION OR GANGRENE: ICD-10-CM

## 2025-01-13 LAB
ANION GAP SERPL CALCULATED.3IONS-SCNC: 8 MMOL/L (ref 5–15)
BUN SERPL-MCNC: 39 MG/DL (ref 8–23)
BUN/CREAT SERPL: 18.7 (ref 7–25)
CALCIUM SPEC-SCNC: 8.9 MG/DL (ref 8.6–10.5)
CHLORIDE SERPL-SCNC: 104 MMOL/L (ref 98–107)
CO2 SERPL-SCNC: 28 MMOL/L (ref 22–29)
CREAT SERPL-MCNC: 2.09 MG/DL (ref 0.76–1.27)
DEPRECATED RDW RBC AUTO: 44.1 FL (ref 37–54)
EGFRCR SERPLBLD CKD-EPI 2021: 34.9 ML/MIN/1.73
ERYTHROCYTE [DISTWIDTH] IN BLOOD BY AUTOMATED COUNT: 13.6 % (ref 12.3–15.4)
GLUCOSE SERPL-MCNC: 117 MG/DL (ref 65–99)
HCT VFR BLD AUTO: 36 % (ref 37.5–51)
HGB BLD-MCNC: 11.5 G/DL (ref 13–17.7)
MCH RBC QN AUTO: 28.7 PG (ref 26.6–33)
MCHC RBC AUTO-ENTMCNC: 31.9 G/DL (ref 31.5–35.7)
MCV RBC AUTO: 89.8 FL (ref 79–97)
PLATELET # BLD AUTO: 266 10*3/MM3 (ref 140–450)
PMV BLD AUTO: 8 FL (ref 6–12)
POTASSIUM SERPL-SCNC: 4.3 MMOL/L (ref 3.5–5.2)
RBC # BLD AUTO: 4.01 10*6/MM3 (ref 4.14–5.8)
SODIUM SERPL-SCNC: 140 MMOL/L (ref 136–145)
WBC NRBC COR # BLD AUTO: 9.03 10*3/MM3 (ref 3.4–10.8)

## 2025-01-13 PROCEDURE — 36415 COLL VENOUS BLD VENIPUNCTURE: CPT

## 2025-01-13 PROCEDURE — 85027 COMPLETE CBC AUTOMATED: CPT

## 2025-01-13 PROCEDURE — 80048 BASIC METABOLIC PNL TOTAL CA: CPT

## 2025-01-21 ENCOUNTER — HOSPITAL ENCOUNTER (OUTPATIENT)
Dept: CT IMAGING | Facility: HOSPITAL | Age: 65
Discharge: HOME | End: 2025-01-21
Admitting: NURSE PRACTITIONER
Payer: MEDICAID

## 2025-01-21 DIAGNOSIS — R91.8 GROUND GLASS OPACITY PRESENT ON IMAGING OF LUNG: ICD-10-CM

## 2025-01-21 PROCEDURE — 71250 CT THORAX DX C-: CPT

## 2025-01-22 ENCOUNTER — HOSPITAL ENCOUNTER (OUTPATIENT)
Facility: HOSPITAL | Age: 65
Setting detail: HOSPITAL OUTPATIENT SURGERY
Discharge: HOME | End: 2025-01-22
Attending: SURGERY | Admitting: SURGERY
Payer: MEDICAID

## 2025-01-22 ENCOUNTER — ANESTHESIA EVENT (OUTPATIENT)
Dept: PERIOP | Facility: HOSPITAL | Age: 65
End: 2025-01-22
Payer: MEDICAID

## 2025-01-22 ENCOUNTER — ANESTHESIA (OUTPATIENT)
Dept: PERIOP | Facility: HOSPITAL | Age: 65
End: 2025-01-22
Payer: MEDICAID

## 2025-01-22 ENCOUNTER — APPOINTMENT (OUTPATIENT)
Dept: GENERAL RADIOLOGY | Facility: HOSPITAL | Age: 65
End: 2025-01-22
Payer: MEDICAID

## 2025-01-22 VITALS
HEART RATE: 78 BPM | OXYGEN SATURATION: 93 % | BODY MASS INDEX: 25.12 KG/M2 | WEIGHT: 150.8 LBS | DIASTOLIC BLOOD PRESSURE: 76 MMHG | HEIGHT: 65 IN | RESPIRATION RATE: 18 BRPM | SYSTOLIC BLOOD PRESSURE: 142 MMHG | TEMPERATURE: 97.9 F

## 2025-01-22 DIAGNOSIS — K40.90 LEFT INGUINAL HERNIA: ICD-10-CM

## 2025-01-22 DIAGNOSIS — K40.91 RECURRENT INGUINAL HERNIA OF RIGHT SIDE WITHOUT OBSTRUCTION OR GANGRENE: ICD-10-CM

## 2025-01-22 PROCEDURE — 49651 LAP ING HERNIA REPAIR RECUR: CPT | Performed by: SURGERY

## 2025-01-22 PROCEDURE — C1729 CATH, DRAINAGE: HCPCS | Performed by: SURGERY

## 2025-01-22 PROCEDURE — 49650 LAP ING HERNIA REPAIR INIT: CPT | Performed by: SURGERY

## 2025-01-22 PROCEDURE — 25810000003 LACTATED RINGERS PER 1000 ML: Performed by: ANESTHESIOLOGY

## 2025-01-22 PROCEDURE — S2900 ROBOTIC SURGICAL SYSTEM: HCPCS | Performed by: SURGERY

## 2025-01-22 PROCEDURE — 25010000002 GLYCOPYRROLATE 0.4 MG/2ML SOLUTION: Performed by: NURSE ANESTHETIST, CERTIFIED REGISTERED

## 2025-01-22 PROCEDURE — 25010000002 ONDANSETRON PER 1 MG: Performed by: NURSE ANESTHETIST, CERTIFIED REGISTERED

## 2025-01-22 PROCEDURE — 71250 CT THORAX DX C-: CPT | Performed by: RADIOLOGY

## 2025-01-22 PROCEDURE — C1781 MESH (IMPLANTABLE): HCPCS | Performed by: SURGERY

## 2025-01-22 PROCEDURE — 25010000002 PROPOFOL 200 MG/20ML EMULSION: Performed by: NURSE ANESTHETIST, CERTIFIED REGISTERED

## 2025-01-22 PROCEDURE — 25010000002 NEOSTIGMINE 10 MG/10ML SOLUTION: Performed by: NURSE ANESTHETIST, CERTIFIED REGISTERED

## 2025-01-22 PROCEDURE — 25010000002 DEXAMETHASONE PER 1 MG: Performed by: ANESTHESIOLOGY

## 2025-01-22 PROCEDURE — 25010000002 ROPIVACAINE PER 1 MG: Performed by: ANESTHESIOLOGY

## 2025-01-22 PROCEDURE — 94799 UNLISTED PULMONARY SVC/PX: CPT

## 2025-01-22 PROCEDURE — 25010000002 FENTANYL CITRATE (PF) 50 MCG/ML SOLUTION: Performed by: NURSE ANESTHETIST, CERTIFIED REGISTERED

## 2025-01-22 PROCEDURE — 25010000002 BUPRENORPHINE PER 0.1 MG: Performed by: ANESTHESIOLOGY

## 2025-01-22 PROCEDURE — 25010000002 CEFAZOLIN PER 500 MG: Performed by: SURGERY

## 2025-01-22 PROCEDURE — 94640 AIRWAY INHALATION TREATMENT: CPT

## 2025-01-22 DEVICE — 3DMAX MID ANATOMICAL MESH, 10 CM X 16 CM (4" X 6"), LARGE, LEFT
Type: IMPLANTABLE DEVICE | Site: ABDOMEN | Status: FUNCTIONAL
Brand: 3DMAX

## 2025-01-22 DEVICE — 3DMAX MID ANATOMICAL MESH, 10 CM X 16 CM (4" X 6"), LARGE, RIGHT
Type: IMPLANTABLE DEVICE | Site: ABDOMEN | Status: FUNCTIONAL
Brand: 3DMAX

## 2025-01-22 DEVICE — ABSORBABLE WOUND CLOSURE DEVICE
Type: IMPLANTABLE DEVICE | Site: INGUINAL | Status: FUNCTIONAL
Brand: V-LOC 90

## 2025-01-22 RX ORDER — ALBUTEROL SULFATE 90 UG/1
INHALANT RESPIRATORY (INHALATION) AS NEEDED
Status: DISCONTINUED | OUTPATIENT
Start: 2025-01-22 | End: 2025-01-22 | Stop reason: SURG

## 2025-01-22 RX ORDER — SODIUM CHLORIDE 0.9 % (FLUSH) 0.9 %
10 SYRINGE (ML) INJECTION AS NEEDED
Status: DISCONTINUED | OUTPATIENT
Start: 2025-01-22 | End: 2025-01-22 | Stop reason: HOSPADM

## 2025-01-22 RX ORDER — IPRATROPIUM BROMIDE AND ALBUTEROL SULFATE 2.5; .5 MG/3ML; MG/3ML
3 SOLUTION RESPIRATORY (INHALATION) ONCE AS NEEDED
Status: COMPLETED | OUTPATIENT
Start: 2025-01-22 | End: 2025-01-22

## 2025-01-22 RX ORDER — BUPRENORPHINE HYDROCHLORIDE 0.32 MG/ML
INJECTION INTRAMUSCULAR; INTRAVENOUS
Status: COMPLETED | OUTPATIENT
Start: 2025-01-22 | End: 2025-01-22

## 2025-01-22 RX ORDER — SODIUM CHLORIDE 0.9 % (FLUSH) 0.9 %
10 SYRINGE (ML) INJECTION EVERY 12 HOURS SCHEDULED
Status: DISCONTINUED | OUTPATIENT
Start: 2025-01-22 | End: 2025-01-22 | Stop reason: HOSPADM

## 2025-01-22 RX ORDER — PHENYLEPHRINE HCL IN 0.9% NACL 1 MG/10 ML
SYRINGE (ML) INTRAVENOUS AS NEEDED
Status: DISCONTINUED | OUTPATIENT
Start: 2025-01-22 | End: 2025-01-22 | Stop reason: SURG

## 2025-01-22 RX ORDER — SODIUM CHLORIDE 0.9 % (FLUSH) 0.9 %
3 SYRINGE (ML) INJECTION EVERY 12 HOURS SCHEDULED
Status: DISCONTINUED | OUTPATIENT
Start: 2025-01-22 | End: 2025-01-22 | Stop reason: HOSPADM

## 2025-01-22 RX ORDER — SODIUM CHLORIDE 9 MG/ML
40 INJECTION, SOLUTION INTRAVENOUS AS NEEDED
Status: DISCONTINUED | OUTPATIENT
Start: 2025-01-22 | End: 2025-01-22 | Stop reason: HOSPADM

## 2025-01-22 RX ORDER — MIDAZOLAM HYDROCHLORIDE 1 MG/ML
1 INJECTION, SOLUTION INTRAMUSCULAR; INTRAVENOUS
Status: DISCONTINUED | OUTPATIENT
Start: 2025-01-22 | End: 2025-01-22 | Stop reason: HOSPADM

## 2025-01-22 RX ORDER — FENTANYL CITRATE 50 UG/ML
50 INJECTION, SOLUTION INTRAMUSCULAR; INTRAVENOUS
Status: DISCONTINUED | OUTPATIENT
Start: 2025-01-22 | End: 2025-01-22 | Stop reason: HOSPADM

## 2025-01-22 RX ORDER — EPHEDRINE SULFATE 5 MG/ML
INJECTION INTRAVENOUS AS NEEDED
Status: DISCONTINUED | OUTPATIENT
Start: 2025-01-22 | End: 2025-01-22 | Stop reason: SURG

## 2025-01-22 RX ORDER — ROCURONIUM BROMIDE 10 MG/ML
INJECTION, SOLUTION INTRAVENOUS AS NEEDED
Status: DISCONTINUED | OUTPATIENT
Start: 2025-01-22 | End: 2025-01-22 | Stop reason: SURG

## 2025-01-22 RX ORDER — DEXAMETHASONE SODIUM PHOSPHATE 4 MG/ML
INJECTION, SOLUTION INTRA-ARTICULAR; INTRALESIONAL; INTRAMUSCULAR; INTRAVENOUS; SOFT TISSUE
Status: COMPLETED | OUTPATIENT
Start: 2025-01-22 | End: 2025-01-22

## 2025-01-22 RX ORDER — FAMOTIDINE 10 MG/ML
INJECTION, SOLUTION INTRAVENOUS AS NEEDED
Status: DISCONTINUED | OUTPATIENT
Start: 2025-01-22 | End: 2025-01-22 | Stop reason: SURG

## 2025-01-22 RX ORDER — NEOSTIGMINE METHYLSULFATE 1 MG/ML
INJECTION INTRAVENOUS AS NEEDED
Status: DISCONTINUED | OUTPATIENT
Start: 2025-01-22 | End: 2025-01-22 | Stop reason: SURG

## 2025-01-22 RX ORDER — GLYCOPYRROLATE 0.2 MG/ML
INJECTION INTRAMUSCULAR; INTRAVENOUS AS NEEDED
Status: DISCONTINUED | OUTPATIENT
Start: 2025-01-22 | End: 2025-01-22 | Stop reason: SURG

## 2025-01-22 RX ORDER — ROPIVACAINE HYDROCHLORIDE 5 MG/ML
INJECTION, SOLUTION EPIDURAL; INFILTRATION; PERINEURAL
Status: COMPLETED | OUTPATIENT
Start: 2025-01-22 | End: 2025-01-22

## 2025-01-22 RX ORDER — PROPOFOL 10 MG/ML
INJECTION, EMULSION INTRAVENOUS AS NEEDED
Status: DISCONTINUED | OUTPATIENT
Start: 2025-01-22 | End: 2025-01-22 | Stop reason: SURG

## 2025-01-22 RX ORDER — ONDANSETRON 2 MG/ML
INJECTION INTRAMUSCULAR; INTRAVENOUS AS NEEDED
Status: DISCONTINUED | OUTPATIENT
Start: 2025-01-22 | End: 2025-01-22 | Stop reason: SURG

## 2025-01-22 RX ORDER — OXYCODONE AND ACETAMINOPHEN 5; 325 MG/1; MG/1
1 TABLET ORAL ONCE AS NEEDED
Status: DISCONTINUED | OUTPATIENT
Start: 2025-01-22 | End: 2025-01-22 | Stop reason: HOSPADM

## 2025-01-22 RX ORDER — METHOCARBAMOL 500 MG/1
500 TABLET, FILM COATED ORAL 3 TIMES DAILY
Qty: 21 TABLET | Refills: 0 | Status: SHIPPED | OUTPATIENT
Start: 2025-01-22 | End: 2025-01-29

## 2025-01-22 RX ORDER — KETOROLAC TROMETHAMINE 30 MG/ML
30 INJECTION, SOLUTION INTRAMUSCULAR; INTRAVENOUS EVERY 6 HOURS PRN
Status: DISCONTINUED | OUTPATIENT
Start: 2025-01-22 | End: 2025-01-22 | Stop reason: HOSPADM

## 2025-01-22 RX ORDER — ONDANSETRON 2 MG/ML
4 INJECTION INTRAMUSCULAR; INTRAVENOUS AS NEEDED
Status: DISCONTINUED | OUTPATIENT
Start: 2025-01-22 | End: 2025-01-22 | Stop reason: HOSPADM

## 2025-01-22 RX ORDER — SODIUM CHLORIDE, SODIUM LACTATE, POTASSIUM CHLORIDE, CALCIUM CHLORIDE 600; 310; 30; 20 MG/100ML; MG/100ML; MG/100ML; MG/100ML
125 INJECTION, SOLUTION INTRAVENOUS ONCE
Status: COMPLETED | OUTPATIENT
Start: 2025-01-22 | End: 2025-01-22

## 2025-01-22 RX ORDER — FENTANYL CITRATE 50 UG/ML
INJECTION, SOLUTION INTRAMUSCULAR; INTRAVENOUS AS NEEDED
Status: DISCONTINUED | OUTPATIENT
Start: 2025-01-22 | End: 2025-01-22 | Stop reason: SURG

## 2025-01-22 RX ORDER — OXYCODONE HYDROCHLORIDE 5 MG/1
5 TABLET ORAL EVERY 4 HOURS PRN
Qty: 20 TABLET | Refills: 0 | Status: SHIPPED | OUTPATIENT
Start: 2025-01-22 | End: 2026-01-22

## 2025-01-22 RX ADMIN — SODIUM CHLORIDE, POTASSIUM CHLORIDE, SODIUM LACTATE AND CALCIUM CHLORIDE: 600; 310; 30; 20 INJECTION, SOLUTION INTRAVENOUS at 13:23

## 2025-01-22 RX ADMIN — EPHEDRINE SULFATE 15 MG: 5 INJECTION INTRAVENOUS at 13:42

## 2025-01-22 RX ADMIN — PROPOFOL 140 MG: 10 INJECTION, EMULSION INTRAVENOUS at 13:27

## 2025-01-22 RX ADMIN — ONDANSETRON 4 MG: 2 INJECTION INTRAMUSCULAR; INTRAVENOUS at 13:23

## 2025-01-22 RX ADMIN — Medication 100 MCG: at 13:44

## 2025-01-22 RX ADMIN — DEXAMETHASONE SODIUM PHOSPHATE 8 MG: 4 INJECTION, SOLUTION INTRA-ARTICULAR; INTRALESIONAL; INTRAMUSCULAR; INTRAVENOUS; SOFT TISSUE at 13:49

## 2025-01-22 RX ADMIN — FAMOTIDINE 20 MG: 10 INJECTION, SOLUTION INTRAVENOUS at 13:23

## 2025-01-22 RX ADMIN — GLYCOPYRROLATE 0.4 MG: 0.2 INJECTION INTRAMUSCULAR; INTRAVENOUS at 15:22

## 2025-01-22 RX ADMIN — EPHEDRINE SULFATE 15 MG: 5 INJECTION INTRAVENOUS at 13:51

## 2025-01-22 RX ADMIN — BUPRENORPHINE HYDROCHLORIDE 0.3 MG: 0.32 INJECTION INTRAMUSCULAR; INTRAVENOUS at 13:49

## 2025-01-22 RX ADMIN — FENTANYL CITRATE 50 MCG: 50 INJECTION INTRAMUSCULAR; INTRAVENOUS at 13:27

## 2025-01-22 RX ADMIN — GLYCOPYRROLATE 0.2 MG: 0.2 INJECTION INTRAMUSCULAR; INTRAVENOUS at 13:54

## 2025-01-22 RX ADMIN — Medication 100 MCG: at 13:46

## 2025-01-22 RX ADMIN — ROCURONIUM BROMIDE 40 MG: 10 SOLUTION INTRAVENOUS at 13:27

## 2025-01-22 RX ADMIN — CEFAZOLIN 2000 MG: 2 INJECTION, POWDER, FOR SOLUTION INTRAMUSCULAR; INTRAVENOUS at 13:33

## 2025-01-22 RX ADMIN — Medication 100 MCG: at 13:51

## 2025-01-22 RX ADMIN — ALBUTEROL SULFATE 3 PUFF: 90 AEROSOL, METERED RESPIRATORY (INHALATION) at 13:32

## 2025-01-22 RX ADMIN — FENTANYL CITRATE 50 MCG: 50 INJECTION INTRAMUSCULAR; INTRAVENOUS at 13:32

## 2025-01-22 RX ADMIN — DESOGESTREL AND ETHINYL ESTRADIOL 2.5 MG: KIT at 15:22

## 2025-01-22 RX ADMIN — IPRATROPIUM BROMIDE AND ALBUTEROL SULFATE 3 ML: 2.5; .5 SOLUTION RESPIRATORY (INHALATION) at 16:15

## 2025-01-22 RX ADMIN — ROPIVACAINE HYDROCHLORIDE 200 MG: 5 INJECTION, SOLUTION EPIDURAL; INFILTRATION; PERINEURAL at 13:49

## 2025-01-22 NOTE — ANESTHESIA POSTPROCEDURE EVALUATION
Patient: Florencio Cintron    Procedure Summary       Date: 01/22/25 Room / Location:  [facility]  /  [facility]    Anesthesia Start: 1323 Anesthesia Stop: 1531    Procedure: INGUINAL HERNIA REPAIR LAPAROSCOPIC WITH DAVINCI ROBOT, bilateral (Bilateral: Abdomen) Diagnosis:       Recurrent inguinal hernia of right side without obstruction or gangrene      Left inguinal hernia      (Recurrent inguinal hernia of right side without obstruction or gangrene [K40.91])      (Left inguinal hernia [K40.90])    Surgeons: Radha Arellano MD Provider: Davonte Robin DO    Anesthesia Type: general ASA Status: 4            Anesthesia Type: general    Vitals  Vitals Value Taken Time   /74 01/22/25 1612   Temp 97.6 °F (36.4 °C) 01/22/25 1533   Pulse 78 01/22/25 1616   Resp 15 01/22/25 1608   SpO2 94 % 01/22/25 1616   Vitals shown include unfiled device data.        Post Anesthesia Care and Evaluation    Patient location during evaluation: PHASE II  Patient participation: complete - patient participated  Level of consciousness: awake and alert  Pain score: 1  Pain management: adequate    Airway patency: patent  Anesthetic complications: No anesthetic complications  PONV Status: controlled  Cardiovascular status: acceptable  Respiratory status: acceptable  Hydration status: acceptable

## 2025-01-22 NOTE — DISCHARGE INSTRUCTIONS
DISCHARGE INSTRUCTIONS    You may shower in 24 hours. It is important that you let soap and water run over your wound to keep it clean. Pat dry with clean towel. Wound does not need to be covered (you have stitches that will dissolve under your skin. You have surgical glue that will fall off on its own).  Do not soak in a tub or go in a pool/swim in water for 2 weeks.  Take over the counter acetaminophen (tylenol) or ibuprofen (advil/motrin) as needed for pain control. These medications may be alternated, follow the recommendations on the medication bottle. Take your prescribed narcotic pain medications as needed for additional pain control. (DO NOT DRIVE WHILE TAKING NARCOTIC PAIN MEDICATION)  Please notify the general surgery clinic should you develop redness, worsening drainage, fever, or increasing pain at your incision site.   Do not lift more than 15 pounds for 6 weeks.     Clinic contact information:  [facility]  [facility] Location: [phone]

## 2025-01-22 NOTE — ANESTHESIA PROCEDURE NOTES
"Peripheral Block      Patient reassessed immediately prior to procedure    Patient location during procedure: OR  Start time: 1/22/2025 1:42 PM  Stop time: 1/22/2025 1:49 PM  Reason for block: at surgeon's request and post-op pain management  Performed by  MIGUEL/CAA: Linsey Devi CRNA  Preanesthetic Checklist  Completed: patient identified, IV checked, site marked, risks and benefits discussed, surgical consent, monitors and equipment checked, pre-op evaluation and timeout performed  Prep:  Pt Position: supine  Sterile barriers:cap, gloves, sterile barriers and mask  Prep: ChloraPrep  Patient monitoring: blood pressure monitoring, continuous pulse oximetry and EKG  Procedure    Nursing cardiac assessment comments yes: Sedation, GA, Spinal,Epidural   Performed under: general  Guidance:ultrasound guided    ULTRASOUND INTERPRETATION.  Using ultrasound guidance a 20 G (20g 4\" Stimuplex) gauge needle was placed in close proximity to the nerve, at which point, under ultrasound guidance anesthetic was injected in the area of the nerve and spread of the anesthesia was seen on ultrasound in close proximity thereto.  There were no abnormalities seen on ultrasound; a digital image was taken; and the patient tolerated the procedure with no complications. Images:still images obtained    Laterality:Bilateral  Block Type:TAP  Injection Technique:single-shot  Needle Type:short-bevel  Needle Gauge:20 G  Resistance on Injection: none    Medications Used: ropivacaine (NAROPIN) injection 0.5 % - Peripheral Nerve   200 mg - 1/22/2025 1:49:00 PM  dexamethasone (DECADRON) injection - Injection   8 mg - 1/22/2025 1:49:00 PM  buprenorphine (BUPRENEX) injection - Injection   0.3 mg - 1/22/2025 1:49:00 PM      Medications  Preservative Free Saline:20ml  Comment:Block Injection:  Total volume divided equally between all 4 injection sites      Post Assessment  Injection Assessment: negative aspiration for heme, incremental injection " and no paresthesia on injection  Patient Tolerance:comfortable throughout block  Complications:no  Additional Notes  The pt was in the supine position under general anesthesia    Under Ultrasound guidance, a BBraun 4inch 360 degree needle was advanced with Normal Saline hydro dissection of tissue.  The Internal Oblique and Transversus Abdominus muscles where visualized.  At or before the aponeurosis of Internal Oblique, local anesthetic spread was visualized in the Transversus Abdominus Plane. Injection was made incrementally with aspiration every 5 mls.  There was no  intravascular injection,  injection pressure was normal, there was no neural injection, and the procedure was completed without difficulty. The same procedure was completed for left and right sided lateral tap blocks.    Under Ultrasound guidance, a Aragon 4inch 360 degree needle was advanced with Normal Saline hydro dissection of tissue.  The Rectus and Transversus Abdominus muscles where visualized.  The needle tip was placed between the Transversus Abdominus and rectus abdominus, local anesthetic spread was visualized in the Transversus Abdominus Plane. Injection was made incrementally with aspiration every 5 mls.  There was no  intravascular injection,  injection pressure was normal, there was no neural injection, and the procedure was completed without difficulty. The same procedure was completed for left and right sided subcostal tap blocks. Thank You.      Performed by: Linsey Devi CRNA

## 2025-01-22 NOTE — ANESTHESIA PREPROCEDURE EVALUATION
Anesthesia Evaluation     Patient summary reviewed and Nursing notes reviewed   no history of anesthetic complications:                Airway   Mallampati: I  TM distance: >3 FB  Neck ROM: full  No difficulty expected  Dental - normal exam     Pulmonary - normal exam   (+) COPD,  Cardiovascular - normal exam  Exercise tolerance: poor (<4 METS)    NYHA Classification: II  Patient on routine beta blocker and Beta blocker given within 24 hours of surgery    (+) hypertension, past MI , CAD, angina      Neuro/Psych  (+) CVA, syncope  GI/Hepatic/Renal/Endo    (+) renal disease-    Musculoskeletal (-) negative ROS    Abdominal  - normal exam    Bowel sounds: normal.   Substance History - negative use     OB/GYN negative ob/gyn ROS         Other - negative ROS       ROS/Med Hx Other: 1. Refractory angina in the setting of severe two vessel CAD, deemed not   to be a candidate for surgical revascularization due to multiple   comorbidities.   2. Successful intravascular lithotripsy-assisted PCI to severe calcific   90% mid RCA stenosis with placement of a 3.0 x 30 mm [place]   drug-eluting stent, post-dilated in the proximal to mid segments to 3.5   mm.   3. Severe ostial left circumflex disease, not intervened upon.   4. Successful hemostasis of 6F right radial arteriotomy with TR band.     Recommendations:   1. Routine vascular access and post-catheterization care per protocol.    2. Continue DAPT with aspirin and clopidogrel for at least 6 months,   followed by clopidogrel monotherapy indefinitely thereafter.   3. Comprehensive cardiovascular risk factor reduction including weight   loss, dietary counseling, tobacco cessation, glycemic control, management   of hypertension and dyslipidemia, as applicable.    4. Medical management of residual circumflex disease.   5. Outpatient follow-up with Dr. Amaya in 4 weeks.                 Anesthesia Plan    ASA 4     general     intravenous induction     Anesthetic plan, risks,  benefits, and alternatives have been provided, discussed and informed consent has been obtained with: patient.    CODE STATUS:

## 2025-01-22 NOTE — ANESTHESIA PROCEDURE NOTES
Airway  Urgency: elective    Date/Time: 1/22/2025 1:30 PM  Airway not difficult    General Information and Staff    Patient location during procedure: OR    Indications and Patient Condition  Indications for airway management: airway protection    Preoxygenated: yes  Mask difficulty assessment: 2 - vent by mask + OA or adjuvant +/- NMBA    Final Airway Details  Final airway type: endotracheal airway      Successful airway: ETT  Cuffed: yes   Successful intubation technique: video laryngoscopy  Facilitating devices/methods: intubating stylet  Endotracheal tube insertion site: oral  Blade: Banuelos  Blade size: 3  ETT size (mm): 7.5  Cormack-Lehane Classification: grade I - full view of glottis  Placement verified by: chest auscultation, capnometry and palpation of cuff   Measured from: lips  ETT/EBT  to lips (cm): 22  Number of attempts at approach: 1  Assessment: lips, teeth, and gum same as pre-op and atraumatic intubation

## 2025-01-23 NOTE — OP NOTE
OPERATIVE NOTE    Patient Name:  Florencio Cintron  Date of Birth: [birth year]  [id number]    Date of Surgery:  1/22/25    PREOPERATIVE DIAGNOSIS: Chronically incarcerated recurrent right inguinal hernia, left inguinal hernia    POSTOPERATIVE DIAGNOSIS: Same      PROCEDURE PERFORMED: Robotic  bilateral inguinal hernia repair using da Leo robot    SURGEON: Radha Arellano MD       Circulator: Chapin Ge RN  Scrub Person: Nathalia Hoff RN; Lorena Hamlin  Vendor Representative: Brittney Mitchell  Assistant: Gordon Urena     Assistant: Gordon Urena    SPECIMENS: None     EBL: 20     ANESTHESIA: General.      FINDINGS:   1. Right direct inguinal hernia, left indirect inguinal hernia     INDICATIONS: The patient is a 64 y.o. M who presented to clinic with a symptomatic right inguinal hernia. When initially evaluated, he had recently undergone a cardiac procedure requiring plavix. The plan was made to delay repair until this could safely be held. In the meantime, he noticed a left hernia developing.  They were interested in operative repair.  We discussed proceeding with a robotic inguinal hernia repair with the benefit of being able to evaluate both sides intraoperatively.  They consented for a  bilateral inguinal hernia repair.    DESCRIPTION OF PROCEDURE:    After obtaining informed consent, the patient was taken to the operating room and placed in supine position. After appropriate DVT and antibiotic prophylaxis, general anesthesia was induced. TAP blocks were placed by the anesthesia staff bilaterally to aid in post-op pain control.  A St catheter was placed.  The abdomen was prepped and draped in standard sterile fashion and timeout was performed.  I began with Veress needle entry at [place].  Opening pressures were appropriate.  Once the abdomen was adequately insufflated, I placed an 8 mm robotic port 2 cm above the umbilicus using Optiview entry.   I then placed the remaining two  8 mm working trocars in a linear fashion, 5 cm medially and 5 cm laterally to my camera port and the robot was docked.  I then proceeded to the robotic console. I evaluated his bilateral inguinal canals, there were bilateral defects present. I began on the right.   I then proceeded with marking my peritoneum, laterally from the ASIS and medially to the median umbilical ligament.  I then proceeded with taking down the peritoneum within this flap, ensuring to protect of the inferior epigastric artery.   I then proceeded with my dissection to reduce the peritoneum from the cord structures.  I was able to complete this dissection with minor holes in the peritoneum.  Once I had created an adequate area of dissection, I was able to visualize all of the potential hernia spaces. There was only the direct hernia I identified.     I then proceeded with the left sided repair. In a similar fashion, I proceeded with marking my peritoneum, laterally from the ASIS and medially to the median umbilical ligament.  I then proceeded with taking down the peritoneum within this flap, ensuring to protect of the inferior epigastric artery.   I then proceeded with my dissection to reduce the peritoneum from the cord structures. This was densely adherent on the left.   I was able to complete this dissection with minor holes in the peritoneum.  A very large peritoneal flap was reduced. Once I had created an adequate area of dissection, I was able to visualize all of the potential hernia spaces. There was only the indirect hernia I identified.       Bilateral large Bard 3DMax mesh was placed into the abdomen as well as  tow 3-0  Vicryl sutures and a 2-0 V-Loc sutures.  I began on the right and then placed a 3-0 Vicryl suture medially to anchor the mesh into place as well as an additional one laterally, ensuring to take small superficial bites to not have any nerve entrapment.  The mesh was adequately secured with no evidence of peritoneum  entering under the mesh.  I was pleased with the way the mesh laid at this point.  I then closed the peritoneal flap using the 2-0 V-Loc suture.  The peritoneal flap of hernia sac was used to cover the small holes made in there peritoneum. In the same fashion, I secured the left sided mesh medially and laterally using a 3-0 vicryl suture. The peritoneal flap was then closed and the redundant hernia sac was used to close the small holes made in the peritoneum. I then returned to the bedside and closed the supraumbilical port with a 0 V-Loc using the Ryan-Bita.  The remaining port was removed under direct visualization.  The abdomen was desufflated and the incisions were closed with a 4-0 Monocryl and covered with surgical glue. The St catheter was removed at the end of the case.     This concluded the operation. At the end of the case, the instrument count was correct. The patient was awoken from anesthesia and transported to PACU for further monitoring.     COMPLICATIONS: None

## 2025-02-07 ENCOUNTER — TELEPHONE (OUTPATIENT)
Age: 65
End: 2025-02-07

## 2025-02-07 NOTE — TELEPHONE ENCOUNTER
Caller: Landy Hoffmann    Relationship:  Emergency Contact    Best call back number: [phone]     PATIENT CALLED REQUESTING TO CANCEL SAME DAY APPT.    Did the patient call AFTER the start time of their scheduled appointment?  []YES  [x]NO    Was the patient's appointment rescheduled? [x]YES  []NO    Any additional information: PT HAVING CAR TROUBLE

## 2025-02-10 ENCOUNTER — OFFICE VISIT (OUTPATIENT)
Dept: CARDIOLOGY | Facility: CLINIC | Age: 65
End: 2025-02-10
Payer: MEDICAID

## 2025-02-10 VITALS
WEIGHT: 153 LBS | OXYGEN SATURATION: 98 % | HEART RATE: 84 BPM | SYSTOLIC BLOOD PRESSURE: 146 MMHG | DIASTOLIC BLOOD PRESSURE: 78 MMHG | BODY MASS INDEX: 25.49 KG/M2 | HEIGHT: 65 IN

## 2025-02-10 DIAGNOSIS — I25.5 ISCHEMIC CARDIOMYOPATHY: Primary | ICD-10-CM

## 2025-02-10 DIAGNOSIS — I25.10 CAD, MULTIPLE VESSEL: ICD-10-CM

## 2025-02-10 DIAGNOSIS — E78.5 HYPERLIPIDEMIA, UNSPECIFIED HYPERLIPIDEMIA TYPE: ICD-10-CM

## 2025-02-10 DIAGNOSIS — I10 PRIMARY HYPERTENSION: ICD-10-CM

## 2025-02-10 DIAGNOSIS — J44.9 CHRONIC OBSTRUCTIVE PULMONARY DISEASE, UNSPECIFIED COPD TYPE: ICD-10-CM

## 2025-02-10 DIAGNOSIS — N18.30 STAGE 3 CHRONIC KIDNEY DISEASE, UNSPECIFIED WHETHER STAGE 3A OR 3B CKD: ICD-10-CM

## 2025-02-10 RX ORDER — RANOLAZINE 500 MG/1
500 TABLET, EXTENDED RELEASE ORAL 2 TIMES DAILY
Qty: 60 TABLET | Refills: 3 | Status: SHIPPED | OUTPATIENT
Start: 2025-02-10

## 2025-02-10 RX ORDER — EZETIMIBE 10 MG/1
10 TABLET ORAL DAILY
Qty: 60 TABLET | Refills: 3 | Status: SHIPPED | OUTPATIENT
Start: 2025-02-10

## 2025-02-13 ENCOUNTER — TELEPHONE (OUTPATIENT)
Age: 65
End: 2025-02-13

## 2025-02-13 NOTE — TELEPHONE ENCOUNTER
Caller: Landy Hoffmann     Relationship:  SIGNIFICANT OTHER     Best call back number: [phone]     PATIENT CALLED REQUESTING TO CANCEL SAME DAY APPT.    Did the patient call AFTER the start time of their scheduled appointment?  [x]YES  []NO    Was the patient's appointment rescheduled? [x]YES  []NO

## 2025-02-25 ENCOUNTER — OFFICE VISIT (OUTPATIENT)
Dept: PULMONOLOGY | Facility: CLINIC | Age: 65
End: 2025-02-25
Payer: MEDICAID

## 2025-02-25 ENCOUNTER — OFFICE VISIT (OUTPATIENT)
Age: 65
End: 2025-02-25
Payer: MEDICAID

## 2025-02-25 VITALS
DIASTOLIC BLOOD PRESSURE: 90 MMHG | HEIGHT: 65 IN | BODY MASS INDEX: 26.69 KG/M2 | SYSTOLIC BLOOD PRESSURE: 152 MMHG | WEIGHT: 160.2 LBS

## 2025-02-25 VITALS
TEMPERATURE: 97.8 F | OXYGEN SATURATION: 96 % | SYSTOLIC BLOOD PRESSURE: 138 MMHG | HEART RATE: 88 BPM | WEIGHT: 160 LBS | BODY MASS INDEX: 26.66 KG/M2 | DIASTOLIC BLOOD PRESSURE: 80 MMHG | HEIGHT: 65 IN

## 2025-02-25 DIAGNOSIS — Z98.890 HISTORY OF INGUINAL HERNIA REPAIR: Primary | ICD-10-CM

## 2025-02-25 DIAGNOSIS — Z87.19 HISTORY OF INGUINAL HERNIA REPAIR: Primary | ICD-10-CM

## 2025-02-25 DIAGNOSIS — F17.211 CIGARETTE NICOTINE DEPENDENCE IN REMISSION: ICD-10-CM

## 2025-02-25 DIAGNOSIS — R91.8 GROUND GLASS OPACITY PRESENT ON IMAGING OF LUNG: ICD-10-CM

## 2025-02-25 DIAGNOSIS — J44.9 CHRONIC OBSTRUCTIVE PULMONARY DISEASE, UNSPECIFIED COPD TYPE: Primary | ICD-10-CM

## 2025-02-25 NOTE — PROGRESS NOTES
"Chief Complaint  Chronic obstructive pulmonary disease, unspecified COPD type    Subjective          Florencio Cintron presents to [facility] for   History of Present Illness    Ms. Cintron is a 64 year old male significant for MI, Stroke, CKD, COPD, CAD, hypertension, and heart failure    He presents today for follow up on COPD. He reports that he has been doing well since his last visit.  He states that his breathing has been at baseline.  He is currently taking Breztri BID and albuterol inhaler as needed. He is a former smoker.    Objective   Vital Signs:   /80   Pulse 88   Temp 97.8 °F (36.6 °C)   Ht 165.1 cm (65\")   Wt 72.6 kg (160 lb)   SpO2 96%   BMI 26.63 kg/m²         Physical Exam    GENERAL APPEARANCE: Well developed, well nourished, alert and cooperative, and appears to be in no acute distress.    HEAD: normocephalic. Atraumatic.    EYES: PERRL, EOMI. Vision is grossly intact.    THROAT: Oral cavity and pharynx normal. No inflammation, swelling, exudate, or lesions.     NECK: Neck supple.  No thyromegaly.    CARDIAC: Normal S1 and S2. No S3, S4 or murmurs. Rhythm is regular.     RESPIRATORY:Bilateral air entry positive. No wheezing, crackles or rhonchi noted.    GI: Positive bowel sounds. Soft, nondistended, nontender.     MUSCULOSKELETAL: No significant deformity or joint abnormality. No edema. Peripheral pulses intact. No varicosities.    NEUROLOGICAL: Strength and sensation symmetric and intact throughout.     PSYCHIATRIC: The mental examination revealed the patient was oriented to person, place, and time.       Estimated body mass index is 26.63 kg/m² as calculated from the following:    Height as of this encounter: 165.1 cm (65\").    Weight as of this encounter: 72.6 kg (160 lb).        Result Review :   The following data was reviewed by: OLIVIA Hunter on 02/25/2025:  Common labs          7/8/2024    21:20 9/3/2024    " 09:33 1/13/2025    15:00   Common Labs   Glucose 107  92  117    BUN 18  30  39    Creatinine 1.19  1.29  2.09    Sodium 141  136  140    Potassium 3.3  4.1  4.3    Chloride 108  104  104    Calcium 9.2  9.4  8.9    Albumin 3.3  3.7     Total Bilirubin 0.2  0.3     Alkaline Phosphatase 91  85     AST (SGOT) 14  9     ALT (SGPT) 13  8     WBC 8.58   9.03    Hemoglobin 12.4   11.5    Hematocrit 37.9   36.0    Platelets 322   266    Total Cholesterol  125     Triglycerides  63     HDL Cholesterol  46     LDL Cholesterol   66     Microalbumin, Urine  53.9              PFT:none     Low dose lung cancer screening:10/7/24     FINDINGS:     LUNGS: 1 cm somewhat ground-glass density right lung image 99 series 2  right lower lobe     HEART: Coronary artery calcifications     MEDIASTINUM: No masses. No enlarged lymph nodes.  No fluid collections.     PLEURA: No pleural effusion. No pleural mass or abnormal calcification.  No pneumothorax.     VASCULATURE: No evidence of aneurysm.     BONES: No acute bony abnormality.     VISUALIZED UPPER ABDOMEN:The upper abdomen is unremarkable as  visualized.     Other: None.     IMPRESSION:     1. 1 cm predominantly ground-glass density in the right lung.  Lung RADS category 3, recommend short interval follow-up CT at 3 to 6  months.                   This report was finalized on 10/8/2024 8:44 AM by Dr. Shawn Pendleton MD.     Previous chest imaging:    CT Chest 1/21/25     FINDINGS:    LIMITATIONS:  Please note that reported measurements are made  manually, as computer generated (AI) measurements can over measure  lesions. Additionally, lesions identified by AI may have been present  presently, significant nodules will be discussed in the report and the  visual depictions of lesions provided by AI are for general reference  only.    LUNGS AND PLEURAL SPACES:  Previously noted groundglass density of the  right lower lobe is grossly stable.  No consolidation.  No pneumothorax.   No  "significant effusion.    HEART:  Unremarkable as visualized.  No cardiomegaly.  No significant  pericardial effusion.  No significant coronary artery calcifications.    BONES/JOINTS:  Unremarkable as visualized.  No acute fracture.  No  dislocation.    SOFT TISSUES:  Unremarkable as visualized.    VASCULATURE:  Atherosclerotic disease.  No thoracic aortic aneurysm.    LYMPH NODES:  Unremarkable as visualized.  No enlarged lymph nodes.     IMPRESSION:    Previously noted groundglass density of the right lower lobe is  grossly stable.     This report was finalized on 1/22/2025 8:59 AM by Dr. Darin Dodge MD.  Alpha-1 antitrypsin screening:NA     STOP-Bang Score:   NA  Crooks Sleepiness Scale:   NA      ABG:    pH No results found for: \"PHART\"   pO2 No results found for: \"PO2ART\"   pCO2 No results found for: \"REK0HDM\"   HCO3 No results found for: \"SFC8OFE\"                      Assessment and Plan    Problem List Items Addressed This Visit          Pulmonary and Pneumonias    Chronic obstructive pulmonary disease - Primary     Other Visit Diagnoses       Cigarette nicotine dependence in remission        Ground glass opacity present on imaging of lung        Relevant Orders    CT Chest Without Contrast          Florencio Cintron  reports that he quit smoking about 4 years ago. His smoking use included cigarettes. He started smoking about 49 years ago. He has a 90 pack-year smoking history. He has been exposed to tobacco smoke. He has never used smokeless tobacco.      Will plan to obtain a spirometry at his next visit.  Continue Breztri BID.  Continue albuterol inhaler as needed.      Will obtain spirometry at next visit.  Continue Breztri BID.  Continue albuterol as needed.    CT Chest showed stability of a 1 cm groundglass density in the right lung.  We will plan to repeat CT Chest in 6 months.     Follow Up   Return in about 6 months (around 8/25/2025).  Patient was given instructions and counseling regarding his " condition or for health maintenance advice. Please see specific information pulled into the AVS if appropriate.

## 2025-02-26 PROBLEM — Z87.19 HISTORY OF INGUINAL HERNIA REPAIR: Status: ACTIVE | Noted: 2025-02-26

## 2025-02-26 PROBLEM — Z98.890 HISTORY OF INGUINAL HERNIA REPAIR: Status: ACTIVE | Noted: 2025-02-26

## 2025-02-26 NOTE — PROGRESS NOTES
"Patient Name:  Florencio Cintron  YOB: 1960  [id number]    Follow Up Note    Date of visit: 2/26/2025    Subjective   Subjective: Presents for follow up after inguinal hernia repair. Reports feeling well without issues.          Objective     Objective:     /90   Ht 165.1 cm (65\")   Wt 72.7 kg (160 lb 3.2 oz)   BMI 26.66 kg/m²       CV:  Regular rate and rhythm  L:  Bilateral lung rise and fall, no use of accessory muscles   Abd:  Soft, nontender, nondistended. Incisions well healed.   Ext:  No cyanosis, clubbing, edema      Assessment/ Plan:  Assessment   Diagnoses and all orders for this visit:    1. History of inguinal hernia repair (Primary)    Mr Cintron presents for follow up after inguinal hernia repair. He is pleased with the results and healing well. Follow up PRN.     Radha Barnes MD       General Surgeon  [facility]       "

## (undated) DEVICE — 1LYRTR 16FR10ML100%SIL UMS SNP: Brand: MEDLINE INDUSTRIES, INC.

## (undated) DEVICE — Device

## (undated) DEVICE — 40595 XL TRENDELENBURG POSITIONING KIT: Brand: 40595 XL TRENDELENBURG POSITIONING KIT

## (undated) DEVICE — CVR DISP HUG U VAC STEEP TREND

## (undated) DEVICE — PK LAP GEN 70

## (undated) DEVICE — SUT MNCRYL PLS ANTIB UD 4/0 PS2 18IN

## (undated) DEVICE — DRV TRAIN NDL ENDOWRIST DAVINCI/X/XI

## (undated) DEVICE — HOLDER: Brand: DEROYAL

## (undated) DEVICE — GOWN,NON-REINFORCED,SIRUS,SET IN SLV,XXL: Brand: MEDLINE

## (undated) DEVICE — FENESTRATED BIPOLAR FORCEPS: Brand: ENDOWRIST

## (undated) DEVICE — BLADELESS OBTURATOR: Brand: WECK VISTA

## (undated) DEVICE — ANTIBACTERIAL UNDYED BRAIDED (POLYGLACTIN 910), SYNTHETIC ABSORBABLE SUTURE: Brand: COATED VICRYL

## (undated) DEVICE — INSUFFLATION NEEDLE TO ESTABLISH PNEUMOPERITONEUM.: Brand: INSUFFLATION NEEDLE

## (undated) DEVICE — TIP COVER ACCESSORY

## (undated) DEVICE — ARM DRAPE

## (undated) DEVICE — PAD GRND REM POLYHESIVE A/ DISP

## (undated) DEVICE — COVER,MAYO STAND,STERILE: Brand: MEDLINE

## (undated) DEVICE — HDRST POSTIN FM CRDL TRACH SLOT NONCOMRESS 9X8X4IN

## (undated) DEVICE — CANNULA SEAL